# Patient Record
Sex: FEMALE | Race: WHITE | NOT HISPANIC OR LATINO | Employment: OTHER | URBAN - METROPOLITAN AREA
[De-identification: names, ages, dates, MRNs, and addresses within clinical notes are randomized per-mention and may not be internally consistent; named-entity substitution may affect disease eponyms.]

---

## 2023-11-09 ENCOUNTER — APPOINTMENT (OUTPATIENT)
Dept: CARDIOLOGY | Facility: CLINIC | Age: 83
End: 2023-11-09
Payer: MEDICARE

## 2023-11-13 DIAGNOSIS — I10 ESSENTIAL HYPERTENSION: ICD-10-CM

## 2023-11-20 ENCOUNTER — OFFICE VISIT (OUTPATIENT)
Dept: CARDIOLOGY | Facility: CLINIC | Age: 83
End: 2023-11-20
Payer: MEDICARE

## 2023-11-20 VITALS
BODY MASS INDEX: 31.15 KG/M2 | DIASTOLIC BLOOD PRESSURE: 58 MMHG | HEIGHT: 62 IN | SYSTOLIC BLOOD PRESSURE: 138 MMHG | WEIGHT: 169.3 LBS

## 2023-11-20 DIAGNOSIS — R00.2 PALPITATIONS: ICD-10-CM

## 2023-11-20 DIAGNOSIS — E78.2 MIXED HYPERLIPIDEMIA: ICD-10-CM

## 2023-11-20 DIAGNOSIS — I27.20 PULMONARY HTN (MULTI): ICD-10-CM

## 2023-11-20 DIAGNOSIS — Z78.9 NEVER SMOKED TOBACCO: ICD-10-CM

## 2023-11-20 DIAGNOSIS — E66.01 CLASS 2 SEVERE OBESITY DUE TO EXCESS CALORIES WITH SERIOUS COMORBIDITY IN ADULT, UNSPECIFIED BMI (MULTI): ICD-10-CM

## 2023-11-20 DIAGNOSIS — I10 ESSENTIAL HYPERTENSION: Primary | ICD-10-CM

## 2023-11-20 PROBLEM — E66.9 OBESE: Status: ACTIVE | Noted: 2023-11-20

## 2023-11-20 PROBLEM — J96.01 ACUTE RESPIRATORY FAILURE WITH HYPOXIA (MULTI): Status: ACTIVE | Noted: 2023-07-30

## 2023-11-20 PROCEDURE — 3075F SYST BP GE 130 - 139MM HG: CPT | Performed by: INTERNAL MEDICINE

## 2023-11-20 PROCEDURE — 1036F TOBACCO NON-USER: CPT | Performed by: INTERNAL MEDICINE

## 2023-11-20 PROCEDURE — 99214 OFFICE O/P EST MOD 30 MIN: CPT | Performed by: INTERNAL MEDICINE

## 2023-11-20 PROCEDURE — 3078F DIAST BP <80 MM HG: CPT | Performed by: INTERNAL MEDICINE

## 2023-11-20 RX ORDER — GABAPENTIN 300 MG/1
300 CAPSULE ORAL 3 TIMES DAILY
COMMUNITY

## 2023-11-20 RX ORDER — MULTIVIT-MIN/IRON/FOLIC ACID/K 18-600-40
1 CAPSULE ORAL 2 TIMES DAILY
COMMUNITY

## 2023-11-20 RX ORDER — LOSARTAN POTASSIUM AND HYDROCHLOROTHIAZIDE 12.5; 5 MG/1; MG/1
1 TABLET ORAL DAILY
Qty: 90 TABLET | Refills: 3 | Status: SHIPPED | OUTPATIENT
Start: 2023-11-20 | End: 2023-12-01 | Stop reason: DRUGHIGH

## 2023-11-20 RX ORDER — NAPROXEN SODIUM 220 MG/1
1 TABLET, FILM COATED ORAL DAILY
COMMUNITY

## 2023-11-20 RX ORDER — ACETAMINOPHEN 500 MG
2000 TABLET ORAL DAILY
COMMUNITY

## 2023-11-20 RX ORDER — ATORVASTATIN CALCIUM 80 MG/1
1 TABLET, FILM COATED ORAL DAILY
COMMUNITY

## 2023-11-20 RX ORDER — FAMOTIDINE 20 MG/1
1 TABLET, FILM COATED ORAL 2 TIMES DAILY
COMMUNITY

## 2023-11-20 RX ORDER — DILTIAZEM HYDROCHLORIDE EXTENDED-RELEASE TABLETS 300 MG/1
300 TABLET, EXTENDED RELEASE ORAL DAILY
COMMUNITY
Start: 2023-09-19

## 2023-11-20 RX ORDER — HYDRALAZINE HYDROCHLORIDE 25 MG/1
25 TABLET, FILM COATED ORAL EVERY 12 HOURS
COMMUNITY

## 2023-11-20 RX ORDER — DOXAZOSIN 2 MG/1
1 TABLET ORAL DAILY
COMMUNITY

## 2023-11-20 RX ORDER — UBIDECARENONE 75 MG
1 CAPSULE ORAL DAILY
COMMUNITY

## 2023-11-20 RX ORDER — LOSARTAN POTASSIUM AND HYDROCHLOROTHIAZIDE 25; 100 MG/1; MG/1
1 TABLET ORAL DAILY
COMMUNITY
End: 2023-11-20 | Stop reason: ALTCHOICE

## 2023-11-20 RX ORDER — ACETAMINOPHEN 325 MG/1
500 TABLET ORAL EVERY 6 HOURS PRN
COMMUNITY
Start: 2023-08-07

## 2023-11-20 RX ORDER — CYANOCOBALAMIN (VITAMIN B-12) 500 MCG
1 TABLET ORAL DAILY
COMMUNITY

## 2023-11-20 ASSESSMENT — ENCOUNTER SYMPTOMS
RESPIRATORY NEGATIVE: 1
NEUROLOGICAL NEGATIVE: 1
CONSTITUTIONAL NEGATIVE: 1
CARDIOVASCULAR NEGATIVE: 1

## 2023-11-20 NOTE — PROGRESS NOTES
"CARDIOLOGY OFFICE VISIT      CHIEF COMPLAINT  Chief Complaint   Patient presents with    Follow-up     Patient presented today for cardiac follow up.        HISTORY OF PRESENT ILLNESS  Therese Child is a 82 y.o. year old female patient with a history of hypertension, moderate pulmonary hypertension with echocardiogram that showed moderate to severe tricuspid regurgitation with RVSP of 52 mmHg.  She is continue to do well denying any chest pain or significant shortness of breath with her day-to-day activities.  She has a chronic gait imbalance and she states she walks as if she is drunk all the time.  She has a pending neurological evaluation as well as ENT evaluation secondary to persistent tinnitus.  She denies chest pain paroxysmal nocturnal dyspnea.    ASSESSMENT AND PLAN  1.  Hypertension: Blood pressures well controlled on current medications, however because of her complaints of dizziness, I will reduce losartan HCTZ to 50/12.5 mg daily and I advised her to follow-up with a neurologist.  2.  Pulmonary hypertension: With stable symptoms and preserved LV function as noted above.  3.  Dyslipidemia: We will get repeat lipids and LFTs prior to next follow-up.    Problem List Items Addressed This Visit       Essential hypertension - Primary    Mixed hyperlipidemia    Palpitations    Pulmonary HTN (CMS/HCC)    Obese    Never smoked tobacco       Recent Cardiovascular Testing:    Echo-  Stress-  Cath-  Carotid Ultrasound-    Past Medical History  No past medical history on file.    Social History  Social History     Tobacco Use    Smoking status: Never    Smokeless tobacco: Never   Substance Use Topics    Alcohol use: Never    Drug use: Never       Family History     Family History   Problem Relation Name Age of Onset    Diabetes type I Mother          Allergies:  Allergies   Allergen Reactions    Ciprofloxacin Other     bleeding gums    Corticosteroids (Glucocorticoids) Other     PT. states she went \"crazy\"    " "Dexamethasone Other     PT. states she went \"crazy\"    Doxycycline Unknown    Oxycodone Unknown        Outpatient Medications:  Current Outpatient Medications   Medication Instructions    acetaminophen (TYLENOL) 500 mg, oral, Every 6 hours PRN    ascorbic acid, vitamin C, 500 mg capsule 1 capsule, oral, 2 times daily    aspirin 81 mg chewable tablet 1 tablet, oral, Daily    atorvastatin (Lipitor) 80 mg tablet 1 tablet, oral, Daily    cholecalciferol (VITAMIN D-3) 2,000 Units, oral, Daily    cyanocobalamin (Vitamin B-12) 500 mcg tablet 1 tablet, oral, Daily    dilTIAZem LA (CARDIZEM LA) 300 mg, oral, Daily    doxazosin (Cardura) 2 mg tablet 1 tablet, oral, Daily    famotidine (Pepcid) 20 mg tablet 1 tablet, oral, 2 times daily    folic acid 1 mg, oral, Daily    gabapentin (NEURONTIN) 300 mg, oral, 3 times daily    hydrALAZINE (APRESOLINE) 25 mg, oral, Every 12 hours        Recent Lab Results:    CBC:   Lab Results   Component Value Date    WBC 4.7 07/29/2023    RBC 3.78 (L) 07/29/2023    HGB 12.4 07/29/2023    HCT 36.1 07/29/2023     (L) 07/29/2023        CMP:    Lab Results   Component Value Date     07/29/2023    K 3.5 07/29/2023    CL 98 07/29/2023    CO2 29 07/29/2023    BUN 25 (H) 07/29/2023    CREATININE 0.86 07/29/2023    GLUCOSE 127 (H) 07/29/2023    CALCIUM 8.8 07/29/2023       Magnesium:    Lab Results   Component Value Date    MG 1.70 07/25/2023       Lipid Profile:    Lab Results   Component Value Date    TRIG 81 06/02/2021    HDL 78.0 06/02/2021       TSH:    Lab Results   Component Value Date    TSH 0.25 (L) 12/27/2018       BNP:   Lab Results   Component Value Date     (H) 07/24/2023        PT/INR:    Lab Results   Component Value Date    PROTIME 12.9 (H) 07/24/2023    INR 1.1 07/24/2023       HgBA1c:    No results found for: \"HGBA1C\"    BMP:  Lab Results   Component Value Date     07/29/2023     (L) 07/28/2023     07/27/2023    K 3.5 07/29/2023    K 3.7 07/28/2023 "    K 3.7 07/27/2023    CL 98 07/29/2023     07/28/2023     07/27/2023    CO2 29 07/29/2023    CO2 29 07/28/2023    CO2 27 07/27/2023    BUN 25 (H) 07/29/2023    BUN 25 (H) 07/28/2023    BUN 26 (H) 07/27/2023    CREATININE 0.86 07/29/2023    CREATININE 0.86 07/28/2023    CREATININE 1.03 07/27/2023       CBC:  Lab Results   Component Value Date    WBC 4.7 07/29/2023    WBC 4.2 (L) 07/28/2023    WBC 3.9 (L) 07/27/2023    RBC 3.78 (L) 07/29/2023    RBC 3.47 (L) 07/28/2023    RBC 3.27 (L) 07/27/2023    HGB 12.4 07/29/2023    HGB 11.3 (L) 07/28/2023    HGB 10.6 (L) 07/27/2023    HCT 36.1 07/29/2023    HCT 33.9 (L) 07/28/2023    HCT 32.1 (L) 07/27/2023    MCV 96 07/29/2023    MCV 98 07/28/2023    MCV 98 07/27/2023    MCHC 34.3 07/29/2023    MCHC 33.3 07/28/2023    MCHC 33.0 07/27/2023    RDW 11.9 07/29/2023    RDW 11.9 07/28/2023    RDW 12.1 07/27/2023     (L) 07/29/2023     (L) 07/28/2023     (L) 07/27/2023       Cardiac Enzymes:    Lab Results   Component Value Date    TROPHS 93 (HH) 07/26/2023    TROPHS CANCELED 07/25/2023    TROPHS 86 (H) 07/25/2023       Hepatic Function Panel:    Lab Results   Component Value Date    ALKPHOS 36 07/29/2023    ALT 13 07/29/2023    AST 17 07/29/2023    PROT 5.9 (L) 07/29/2023    BILITOT 0.6 07/29/2023    BILIDIR 0.3 03/24/2020         REVIEW OF SYSTEMS  Review of Systems   Constitutional: Negative.   Cardiovascular: Negative.    Respiratory: Negative.     Neurological: Negative.    All other systems reviewed and are negative.      VITALS  Vitals:    11/20/23 1042   BP: 138/58     Wt Readings from Last 4 Encounters:   11/20/23 76.8 kg (169 lb 4.8 oz)   02/21/23 81.6 kg (180 lb)   07/27/22 84.9 kg (187 lb 4 oz)   01/05/22 85.7 kg (188 lb 14.4 oz)       PHYSICAL EXAM  Constitutional:       Appearance: Healthy appearance. Not in distress.   Eyes:      Conjunctiva/sclera: Conjunctivae normal.      Pupils: Pupils are equal, round, and reactive to light.    Neck:      Vascular: No JVR. JVD normal.   Pulmonary:      Effort: Pulmonary effort is normal.      Breath sounds: Normal breath sounds. No wheezing. No rhonchi. No rales.   Chest:      Chest wall: Not tender to palpatation.   Cardiovascular:      PMI at left midclavicular line. Normal rate. Regular rhythm. Normal S1. Normal S2.       Murmurs:  2/6 systolic murmur  in right and left sternal border      No gallop.  No click. No rub.   Pulses:     Intact distal pulses.   Edema:     Peripheral edema absent.   Abdominal:      Tenderness: There is no abdominal tenderness.   Musculoskeletal: Normal range of motion.         General: No tenderness.      Cervical back: Normal range of motion. Skin:     General: Skin is warm and dry.   Neurological:      General: No focal deficit present.      Mental Status: Alert and oriented to person, place and time.

## 2023-11-21 RX ORDER — DILTIAZEM HYDROCHLORIDE 360 MG/1
360 CAPSULE, EXTENDED RELEASE ORAL DAILY
Qty: 90 CAPSULE | Refills: 3 | OUTPATIENT
Start: 2023-11-21

## 2023-11-27 ENCOUNTER — TELEPHONE (OUTPATIENT)
Dept: CARDIOLOGY | Facility: CLINIC | Age: 83
End: 2023-11-27
Payer: MEDICARE

## 2023-11-27 DIAGNOSIS — I10 ESSENTIAL HYPERTENSION: Primary | ICD-10-CM

## 2023-11-27 NOTE — TELEPHONE ENCOUNTER
Pt left message stating that her bp is starting to run high since the decrease in her medication.  Per pt it is running in the 150's.  Per pt this morning her SBP was 161.    Pt is asking for return call with recommendations.    Routed to Bryant BOLIVAR LPN

## 2023-12-01 RX ORDER — LOSARTAN POTASSIUM AND HYDROCHLOROTHIAZIDE 12.5; 1 MG/1; MG/1
1 TABLET ORAL DAILY
Qty: 90 TABLET | Refills: 3 | OUTPATIENT
Start: 2023-12-01 | End: 2024-11-30

## 2023-12-01 NOTE — TELEPHONE ENCOUNTER
I called patient and BP is still elevated. Per Dr David increase losartan back to 100 mg /hydrochlorothiazide 12.5 mg a day. Sent to [facility] to auth. [facility].

## 2023-12-04 DIAGNOSIS — I10 ESSENTIAL HYPERTENSION: ICD-10-CM

## 2023-12-04 RX ORDER — LOSARTAN POTASSIUM AND HYDROCHLOROTHIAZIDE 12.5; 5 MG/1; MG/1
1 TABLET ORAL DAILY
Qty: 90 TABLET | Refills: 3 | OUTPATIENT
Start: 2023-12-04 | End: 2024-12-03

## 2024-01-31 ENCOUNTER — APPOINTMENT (OUTPATIENT)
Dept: CARDIOLOGY | Facility: CLINIC | Age: 84
End: 2024-01-31
Payer: MEDICARE

## 2024-02-02 ENCOUNTER — APPOINTMENT (OUTPATIENT)
Dept: CARDIOLOGY | Facility: CLINIC | Age: 84
End: 2024-02-02
Payer: MEDICARE

## 2024-03-01 DIAGNOSIS — I10 ESSENTIAL HYPERTENSION: ICD-10-CM

## 2024-03-22 RX ORDER — DILTIAZEM HYDROCHLORIDE 360 MG/1
360 CAPSULE, EXTENDED RELEASE ORAL DAILY
Qty: 30 CAPSULE | Refills: 11 | Status: SHIPPED | OUTPATIENT
Start: 2024-03-22

## 2025-01-31 ENCOUNTER — APPOINTMENT (OUTPATIENT)
Dept: RADIOLOGY | Facility: HOSPITAL | Age: 85
End: 2025-01-31
Payer: MEDICARE

## 2025-01-31 ENCOUNTER — HOSPITAL ENCOUNTER (EMERGENCY)
Dept: CARDIOLOGY | Facility: HOSPITAL | Age: 85
Discharge: HOME | End: 2025-01-31
Payer: MEDICARE

## 2025-01-31 ENCOUNTER — HOSPITAL ENCOUNTER (INPATIENT)
Facility: HOSPITAL | Age: 85
Discharge: HOME | End: 2025-01-31
Attending: EMERGENCY MEDICINE | Admitting: HOSPITALIST
Payer: MEDICARE

## 2025-01-31 DIAGNOSIS — B33.8 RSV INFECTION: ICD-10-CM

## 2025-01-31 DIAGNOSIS — I10 ESSENTIAL HYPERTENSION: ICD-10-CM

## 2025-01-31 DIAGNOSIS — N17.9 AKI (ACUTE KIDNEY INJURY) (CMS-HCC): ICD-10-CM

## 2025-01-31 DIAGNOSIS — J18.9 PNEUMONIA OF LEFT LUNG DUE TO INFECTIOUS ORGANISM, UNSPECIFIED PART OF LUNG: Primary | ICD-10-CM

## 2025-01-31 DIAGNOSIS — R06.02 SHORTNESS OF BREATH: ICD-10-CM

## 2025-01-31 LAB
ALBUMIN SERPL BCP-MCNC: 4.2 G/DL (ref 3.4–5)
ALP SERPL-CCNC: 70 U/L (ref 33–136)
ALT SERPL W P-5'-P-CCNC: 13 U/L (ref 7–45)
ANION GAP SERPL CALC-SCNC: 16 MMOL/L (ref 10–20)
APPEARANCE UR: CLEAR
AST SERPL W P-5'-P-CCNC: 21 U/L (ref 9–39)
ATRIAL RATE: 66 BPM
BASOPHILS # BLD AUTO: 0.05 X10*3/UL (ref 0–0.1)
BASOPHILS NFR BLD AUTO: 0.5 %
BILIRUB SERPL-MCNC: 0.6 MG/DL (ref 0–1.2)
BILIRUB UR STRIP.AUTO-MCNC: NEGATIVE MG/DL
BUN SERPL-MCNC: 28 MG/DL (ref 6–23)
CALCIUM SERPL-MCNC: 9.6 MG/DL (ref 8.6–10.3)
CARDIAC TROPONIN I PNL SERPL HS: 90 NG/L (ref 0–13)
CARDIAC TROPONIN I PNL SERPL HS: 97 NG/L (ref 0–13)
CHLORIDE SERPL-SCNC: 96 MMOL/L (ref 98–107)
CO2 SERPL-SCNC: 24 MMOL/L (ref 21–32)
COLOR UR: YELLOW
CREAT SERPL-MCNC: 1.06 MG/DL (ref 0.5–1.05)
EGFRCR SERPLBLD CKD-EPI 2021: 52 ML/MIN/1.73M*2
EOSINOPHIL # BLD AUTO: 0 X10*3/UL (ref 0–0.4)
EOSINOPHIL NFR BLD AUTO: 0 %
ERYTHROCYTE [DISTWIDTH] IN BLOOD BY AUTOMATED COUNT: 13.2 % (ref 11.5–14.5)
FLUAV RNA RESP QL NAA+PROBE: NOT DETECTED
FLUBV RNA RESP QL NAA+PROBE: NOT DETECTED
GLUCOSE SERPL-MCNC: 102 MG/DL (ref 74–99)
GLUCOSE UR STRIP.AUTO-MCNC: NORMAL MG/DL
HCT VFR BLD AUTO: 36.7 % (ref 36–46)
HGB BLD-MCNC: 12.8 G/DL (ref 12–16)
HOLD SPECIMEN: NORMAL
IMM GRANULOCYTES # BLD AUTO: 0.04 X10*3/UL (ref 0–0.5)
IMM GRANULOCYTES NFR BLD AUTO: 0.4 % (ref 0–0.9)
INR PPP: 1.1 (ref 0.9–1.1)
KETONES UR STRIP.AUTO-MCNC: ABNORMAL MG/DL
LACTATE SERPL-SCNC: 0.9 MMOL/L (ref 0.4–2)
LEUKOCYTE ESTERASE UR QL STRIP.AUTO: NEGATIVE
LYMPHOCYTES # BLD AUTO: 0.57 X10*3/UL (ref 0.8–3)
LYMPHOCYTES NFR BLD AUTO: 5.5 %
MAGNESIUM SERPL-MCNC: 1.82 MG/DL (ref 1.6–2.4)
MCH RBC QN AUTO: 33 PG (ref 26–34)
MCHC RBC AUTO-ENTMCNC: 34.9 G/DL (ref 32–36)
MCV RBC AUTO: 95 FL (ref 80–100)
MONOCYTES # BLD AUTO: 0.59 X10*3/UL (ref 0.05–0.8)
MONOCYTES NFR BLD AUTO: 5.7 %
NEUTROPHILS # BLD AUTO: 9.07 X10*3/UL (ref 1.6–5.5)
NEUTROPHILS NFR BLD AUTO: 87.9 %
NITRITE UR QL STRIP.AUTO: NEGATIVE
NRBC BLD-RTO: 0 /100 WBCS (ref 0–0)
P AXIS: 20 DEGREES
P OFFSET: 207 MS
P ONSET: 154 MS
PH UR STRIP.AUTO: 6 [PH]
PLATELET # BLD AUTO: 221 X10*3/UL (ref 150–450)
POTASSIUM SERPL-SCNC: 4.7 MMOL/L (ref 3.5–5.3)
PR INTERVAL: 146 MS
PROT SERPL-MCNC: 7 G/DL (ref 6.4–8.2)
PROT UR STRIP.AUTO-MCNC: ABNORMAL MG/DL
PROTHROMBIN TIME: 12.1 SECONDS (ref 9.8–12.8)
Q ONSET: 227 MS
QRS COUNT: 11 BEATS
QRS DURATION: 84 MS
QT INTERVAL: 370 MS
QTC CALCULATION(BAZETT): 387 MS
QTC FREDERICIA: 382 MS
R AXIS: -26 DEGREES
RBC # BLD AUTO: 3.88 X10*6/UL (ref 4–5.2)
RBC # UR STRIP.AUTO: NEGATIVE /UL
RBC #/AREA URNS AUTO: ABNORMAL /HPF
RSV RNA RESP QL NAA+PROBE: DETECTED
SARS-COV-2 RNA RESP QL NAA+PROBE: NOT DETECTED
SODIUM SERPL-SCNC: 131 MMOL/L (ref 136–145)
SP GR UR STRIP.AUTO: 1.02
T AXIS: 111 DEGREES
T OFFSET: 412 MS
UROBILINOGEN UR STRIP.AUTO-MCNC: NORMAL MG/DL
VENTRICULAR RATE: 66 BPM
WBC # BLD AUTO: 10.3 X10*3/UL (ref 4.4–11.3)
WBC #/AREA URNS AUTO: ABNORMAL /HPF

## 2025-01-31 PROCEDURE — 87637 SARSCOV2&INF A&B&RSV AMP PRB: CPT | Performed by: NURSE PRACTITIONER

## 2025-01-31 PROCEDURE — 2500000004 HC RX 250 GENERAL PHARMACY W/ HCPCS (ALT 636 FOR OP/ED): Performed by: NURSE PRACTITIONER

## 2025-01-31 PROCEDURE — 94640 AIRWAY INHALATION TREATMENT: CPT

## 2025-01-31 PROCEDURE — 71275 CT ANGIOGRAPHY CHEST: CPT | Mod: FOREIGN READ | Performed by: RADIOLOGY

## 2025-01-31 PROCEDURE — 85610 PROTHROMBIN TIME: CPT | Performed by: NURSE PRACTITIONER

## 2025-01-31 PROCEDURE — 2500000001 HC RX 250 WO HCPCS SELF ADMINISTERED DRUGS (ALT 637 FOR MEDICARE OP): Performed by: NURSE PRACTITIONER

## 2025-01-31 PROCEDURE — 81001 URINALYSIS AUTO W/SCOPE: CPT | Performed by: NURSE PRACTITIONER

## 2025-01-31 PROCEDURE — 87636 SARSCOV2 & INF A&B AMP PRB: CPT | Performed by: NURSE PRACTITIONER

## 2025-01-31 PROCEDURE — 83735 ASSAY OF MAGNESIUM: CPT | Performed by: NURSE PRACTITIONER

## 2025-01-31 PROCEDURE — 71275 CT ANGIOGRAPHY CHEST: CPT

## 2025-01-31 PROCEDURE — 93005 ELECTROCARDIOGRAM TRACING: CPT

## 2025-01-31 PROCEDURE — 99223 1ST HOSP IP/OBS HIGH 75: CPT | Performed by: HOSPITALIST

## 2025-01-31 PROCEDURE — 87040 BLOOD CULTURE FOR BACTERIA: CPT | Mod: ELYLAB | Performed by: NURSE PRACTITIONER

## 2025-01-31 PROCEDURE — 96367 TX/PROPH/DG ADDL SEQ IV INF: CPT

## 2025-01-31 PROCEDURE — 2500000004 HC RX 250 GENERAL PHARMACY W/ HCPCS (ALT 636 FOR OP/ED): Performed by: HOSPITALIST

## 2025-01-31 PROCEDURE — 96365 THER/PROPH/DIAG IV INF INIT: CPT

## 2025-01-31 PROCEDURE — 2550000001 HC RX 255 CONTRASTS: Performed by: EMERGENCY MEDICINE

## 2025-01-31 PROCEDURE — 84484 ASSAY OF TROPONIN QUANT: CPT | Performed by: NURSE PRACTITIONER

## 2025-01-31 PROCEDURE — 99285 EMERGENCY DEPT VISIT HI MDM: CPT | Mod: 25 | Performed by: EMERGENCY MEDICINE

## 2025-01-31 PROCEDURE — 2500000001 HC RX 250 WO HCPCS SELF ADMINISTERED DRUGS (ALT 637 FOR MEDICARE OP): Performed by: HOSPITALIST

## 2025-01-31 PROCEDURE — 36415 COLL VENOUS BLD VENIPUNCTURE: CPT | Performed by: NURSE PRACTITIONER

## 2025-01-31 PROCEDURE — 85025 COMPLETE CBC W/AUTO DIFF WBC: CPT | Performed by: NURSE PRACTITIONER

## 2025-01-31 PROCEDURE — 80053 COMPREHEN METABOLIC PANEL: CPT | Performed by: NURSE PRACTITIONER

## 2025-01-31 PROCEDURE — 71045 X-RAY EXAM CHEST 1 VIEW: CPT | Mod: FOREIGN READ | Performed by: SPECIALIST

## 2025-01-31 PROCEDURE — 2500000002 HC RX 250 W HCPCS SELF ADMINISTERED DRUGS (ALT 637 FOR MEDICARE OP, ALT 636 FOR OP/ED): Performed by: HOSPITALIST

## 2025-01-31 PROCEDURE — 83605 ASSAY OF LACTIC ACID: CPT | Performed by: NURSE PRACTITIONER

## 2025-01-31 PROCEDURE — 71045 X-RAY EXAM CHEST 1 VIEW: CPT

## 2025-01-31 PROCEDURE — 1200000002 HC GENERAL ROOM WITH TELEMETRY DAILY

## 2025-01-31 RX ORDER — NAPROXEN SODIUM 220 MG/1
81 TABLET, FILM COATED ORAL DAILY
Status: DISPENSED | OUTPATIENT
Start: 2025-01-31

## 2025-01-31 RX ORDER — BENZONATATE 100 MG/1
100 CAPSULE ORAL EVERY 8 HOURS PRN
Status: DISPENSED | OUTPATIENT
Start: 2025-01-31

## 2025-01-31 RX ORDER — ALBUTEROL SULFATE 90 UG/1
2 INHALANT RESPIRATORY (INHALATION) EVERY 4 HOURS PRN
Status: ON HOLD | COMMUNITY
Start: 2025-01-30

## 2025-01-31 RX ORDER — DOXAZOSIN 1 MG/1
2 TABLET ORAL DAILY
Status: DISPENSED | OUTPATIENT
Start: 2025-01-31

## 2025-01-31 RX ORDER — ENOXAPARIN SODIUM 100 MG/ML
40 INJECTION SUBCUTANEOUS EVERY 24 HOURS
Status: DISPENSED | OUTPATIENT
Start: 2025-01-31

## 2025-01-31 RX ORDER — ATORVASTATIN CALCIUM 80 MG/1
80 TABLET, FILM COATED ORAL DAILY
Status: DISPENSED | OUTPATIENT
Start: 2025-01-31

## 2025-01-31 RX ORDER — ACETAMINOPHEN 325 MG/1
975 TABLET ORAL ONCE
Status: COMPLETED | OUTPATIENT
Start: 2025-01-31 | End: 2025-01-31

## 2025-01-31 RX ORDER — HYDRALAZINE HYDROCHLORIDE 25 MG/1
25 TABLET, FILM COATED ORAL EVERY 12 HOURS
Status: DISPENSED | OUTPATIENT
Start: 2025-01-31

## 2025-01-31 RX ORDER — DILTIAZEM HYDROCHLORIDE 300 MG/1
300 CAPSULE, COATED, EXTENDED RELEASE ORAL DAILY
Status: DISPENSED | OUTPATIENT
Start: 2025-02-01

## 2025-01-31 RX ORDER — METHOCARBAMOL 500 MG/1
500 TABLET, FILM COATED ORAL EVERY 8 HOURS SCHEDULED
Status: DISCONTINUED | OUTPATIENT
Start: 2025-01-31 | End: 2025-01-31

## 2025-01-31 RX ORDER — PREDNISONE 20 MG/1
20 TABLET ORAL DAILY
Status: DISPENSED | OUTPATIENT
Start: 2025-01-31

## 2025-01-31 RX ORDER — DILTIAZEM HYDROCHLORIDE 300 MG/1
1 CAPSULE, EXTENDED RELEASE ORAL DAILY
Status: ON HOLD | COMMUNITY
Start: 2024-11-16

## 2025-01-31 RX ORDER — METHOCARBAMOL 500 MG/1
500 TABLET, FILM COATED ORAL 3 TIMES DAILY PRN
Status: ACTIVE | OUTPATIENT
Start: 2025-01-31

## 2025-01-31 RX ORDER — IPRATROPIUM BROMIDE AND ALBUTEROL SULFATE 2.5; .5 MG/3ML; MG/3ML
3 SOLUTION RESPIRATORY (INHALATION)
Status: DISCONTINUED | OUTPATIENT
Start: 2025-01-31 | End: 2025-02-01

## 2025-01-31 RX ORDER — AZITHROMYCIN 250 MG/1
500 TABLET, FILM COATED ORAL
Status: DISPENSED | OUTPATIENT
Start: 2025-02-01

## 2025-01-31 RX ORDER — FOLIC ACID 1 MG/1
0.5 TABLET ORAL DAILY
Status: DISPENSED | OUTPATIENT
Start: 2025-01-31

## 2025-01-31 RX ORDER — METHYLPREDNISOLONE 4 MG/1
TABLET ORAL
Status: ON HOLD | COMMUNITY
Start: 2025-01-30 | End: 2025-02-05

## 2025-01-31 RX ORDER — CEFTRIAXONE 1 G/50ML
1 INJECTION, SOLUTION INTRAVENOUS EVERY 24 HOURS
Status: DISPENSED | OUTPATIENT
Start: 2025-02-01

## 2025-01-31 RX ORDER — BENZONATATE 100 MG/1
100 CAPSULE ORAL EVERY 8 HOURS PRN
Status: ON HOLD | COMMUNITY
Start: 2025-01-30 | End: 2025-02-04

## 2025-01-31 RX ORDER — METHOCARBAMOL 500 MG/1
500 TABLET, FILM COATED ORAL 2 TIMES DAILY
Status: ON HOLD | COMMUNITY
Start: 2024-10-11

## 2025-01-31 RX ORDER — DILTIAZEM HYDROCHLORIDE 180 MG/1
360 CAPSULE, COATED, EXTENDED RELEASE ORAL DAILY
Status: DISCONTINUED | OUTPATIENT
Start: 2025-01-31 | End: 2025-01-31

## 2025-01-31 RX ORDER — LOSARTAN POTASSIUM AND HYDROCHLOROTHIAZIDE 25; 100 MG/1; MG/1
1 TABLET ORAL
Status: ON HOLD | COMMUNITY
Start: 2024-10-11

## 2025-01-31 RX ORDER — L. ACIDOPHILUS/L.BULGARICUS 1MM CELL
1 TABLET ORAL 2 TIMES DAILY
Status: DISPENSED | OUTPATIENT
Start: 2025-01-31

## 2025-01-31 RX ORDER — BUPROPION HYDROCHLORIDE 150 MG/1
150 TABLET ORAL
Status: DISPENSED | OUTPATIENT
Start: 2025-01-31

## 2025-01-31 RX ORDER — BUPROPION HYDROCHLORIDE 150 MG/1
150 TABLET ORAL
Status: ON HOLD | COMMUNITY
Start: 2024-08-23

## 2025-01-31 RX ORDER — GABAPENTIN 300 MG/1
300 CAPSULE ORAL 3 TIMES DAILY
Status: DISPENSED | OUTPATIENT
Start: 2025-01-31

## 2025-01-31 RX ADMIN — AZITHROMYCIN MONOHYDRATE 500 MG: 500 INJECTION, POWDER, LYOPHILIZED, FOR SOLUTION INTRAVENOUS at 15:25

## 2025-01-31 RX ADMIN — PREDNISONE 20 MG: 20 TABLET ORAL at 18:14

## 2025-01-31 RX ADMIN — Medication 1 TABLET: at 20:59

## 2025-01-31 RX ADMIN — ACETAMINOPHEN 975 MG: 325 TABLET ORAL at 10:43

## 2025-01-31 RX ADMIN — ASPIRIN 81 MG: 81 TABLET, CHEWABLE ORAL at 18:13

## 2025-01-31 RX ADMIN — IOHEXOL 75 ML: 350 INJECTION, SOLUTION INTRAVENOUS at 14:02

## 2025-01-31 RX ADMIN — FOLIC ACID 0.5 MG: 1 TABLET ORAL at 18:13

## 2025-01-31 RX ADMIN — GABAPENTIN 300 MG: 300 CAPSULE ORAL at 18:05

## 2025-01-31 RX ADMIN — DEXTROSE MONOHYDRATE 2 G: 5 INJECTION INTRAVENOUS at 14:41

## 2025-01-31 RX ADMIN — BENZONATATE 100 MG: 100 CAPSULE ORAL at 18:14

## 2025-01-31 RX ADMIN — ATORVASTATIN CALCIUM 80 MG: 80 TABLET, FILM COATED ORAL at 18:14

## 2025-01-31 RX ADMIN — HYDRALAZINE HYDROCHLORIDE 25 MG: 25 TABLET ORAL at 18:14

## 2025-01-31 RX ADMIN — BUPROPION HYDROCHLORIDE 150 MG: 150 TABLET, EXTENDED RELEASE ORAL at 18:14

## 2025-01-31 RX ADMIN — IPRATROPIUM BROMIDE AND ALBUTEROL SULFATE 3 ML: .5; 3 SOLUTION RESPIRATORY (INHALATION) at 20:02

## 2025-01-31 RX ADMIN — ENOXAPARIN SODIUM 40 MG: 40 INJECTION SUBCUTANEOUS at 21:00

## 2025-01-31 RX ADMIN — GABAPENTIN 300 MG: 300 CAPSULE ORAL at 20:59

## 2025-01-31 RX ADMIN — DOXAZOSIN 2 MG: 1 TABLET ORAL at 18:13

## 2025-01-31 SDOH — SOCIAL STABILITY: SOCIAL INSECURITY: WITHIN THE LAST YEAR, HAVE YOU BEEN HUMILIATED OR EMOTIONALLY ABUSED IN OTHER WAYS BY YOUR PARTNER OR EX-PARTNER?: NO

## 2025-01-31 SDOH — SOCIAL STABILITY: SOCIAL INSECURITY: ARE YOU OR HAVE YOU BEEN THREATENED OR ABUSED PHYSICALLY, EMOTIONALLY, OR SEXUALLY BY ANYONE?: NO

## 2025-01-31 SDOH — ECONOMIC STABILITY: FOOD INSECURITY: WITHIN THE PAST 12 MONTHS, YOU WORRIED THAT YOUR FOOD WOULD RUN OUT BEFORE YOU GOT THE MONEY TO BUY MORE.: NEVER TRUE

## 2025-01-31 SDOH — ECONOMIC STABILITY: HOUSING INSECURITY: AT ANY TIME IN THE PAST 12 MONTHS, WERE YOU HOMELESS OR LIVING IN A SHELTER (INCLUDING NOW)?: NO

## 2025-01-31 SDOH — SOCIAL STABILITY: SOCIAL INSECURITY: WITHIN THE LAST YEAR, HAVE YOU BEEN AFRAID OF YOUR PARTNER OR EX-PARTNER?: NO

## 2025-01-31 SDOH — ECONOMIC STABILITY: TRANSPORTATION INSECURITY: IN THE PAST 12 MONTHS, HAS LACK OF TRANSPORTATION KEPT YOU FROM MEDICAL APPOINTMENTS OR FROM GETTING MEDICATIONS?: NO

## 2025-01-31 SDOH — ECONOMIC STABILITY: FOOD INSECURITY: WITHIN THE PAST 12 MONTHS, THE FOOD YOU BOUGHT JUST DIDN'T LAST AND YOU DIDN'T HAVE MONEY TO GET MORE.: NEVER TRUE

## 2025-01-31 SDOH — SOCIAL STABILITY: SOCIAL INSECURITY
WITHIN THE LAST YEAR, HAVE YOU BEEN KICKED, HIT, SLAPPED, OR OTHERWISE PHYSICALLY HURT BY YOUR PARTNER OR EX-PARTNER?: NO

## 2025-01-31 SDOH — SOCIAL STABILITY: SOCIAL INSECURITY: DO YOU FEEL ANYONE HAS EXPLOITED OR TAKEN ADVANTAGE OF YOU FINANCIALLY OR OF YOUR PERSONAL PROPERTY?: NO

## 2025-01-31 SDOH — SOCIAL STABILITY: SOCIAL INSECURITY: HAS ANYONE EVER THREATENED TO HURT YOUR FAMILY OR YOUR PETS?: NO

## 2025-01-31 SDOH — SOCIAL STABILITY: SOCIAL INSECURITY: HAVE YOU HAD ANY THOUGHTS OF HARMING ANYONE ELSE?: NO

## 2025-01-31 SDOH — SOCIAL STABILITY: SOCIAL INSECURITY: ABUSE: ADULT

## 2025-01-31 SDOH — ECONOMIC STABILITY: HOUSING INSECURITY: IN THE LAST 12 MONTHS, WAS THERE A TIME WHEN YOU WERE NOT ABLE TO PAY THE MORTGAGE OR RENT ON TIME?: NO

## 2025-01-31 SDOH — ECONOMIC STABILITY: INCOME INSECURITY: IN THE PAST 12 MONTHS HAS THE ELECTRIC, GAS, OIL, OR WATER COMPANY THREATENED TO SHUT OFF SERVICES IN YOUR HOME?: NO

## 2025-01-31 SDOH — SOCIAL STABILITY: SOCIAL INSECURITY
WITHIN THE LAST YEAR, HAVE YOU BEEN RAPED OR FORCED TO HAVE ANY KIND OF SEXUAL ACTIVITY BY YOUR PARTNER OR EX-PARTNER?: NO

## 2025-01-31 SDOH — ECONOMIC STABILITY: FOOD INSECURITY: HOW HARD IS IT FOR YOU TO PAY FOR THE VERY BASICS LIKE FOOD, HOUSING, MEDICAL CARE, AND HEATING?: NOT HARD AT ALL

## 2025-01-31 SDOH — SOCIAL STABILITY: SOCIAL INSECURITY: DO YOU FEEL UNSAFE GOING BACK TO THE PLACE WHERE YOU ARE LIVING?: NO

## 2025-01-31 SDOH — SOCIAL STABILITY: SOCIAL INSECURITY: WERE YOU ABLE TO COMPLETE ALL THE BEHAVIORAL HEALTH SCREENINGS?: YES

## 2025-01-31 SDOH — SOCIAL STABILITY: SOCIAL INSECURITY: DOES ANYONE TRY TO KEEP YOU FROM HAVING/CONTACTING OTHER FRIENDS OR DOING THINGS OUTSIDE YOUR HOME?: NO

## 2025-01-31 SDOH — SOCIAL STABILITY: SOCIAL INSECURITY: ARE THERE ANY APPARENT SIGNS OF INJURIES/BEHAVIORS THAT COULD BE RELATED TO ABUSE/NEGLECT?: NO

## 2025-01-31 SDOH — ECONOMIC STABILITY: HOUSING INSECURITY: IN THE PAST 12 MONTHS, HOW MANY TIMES HAVE YOU MOVED WHERE YOU WERE LIVING?: 1

## 2025-01-31 SDOH — SOCIAL STABILITY: SOCIAL INSECURITY: HAVE YOU HAD THOUGHTS OF HARMING ANYONE ELSE?: NO

## 2025-01-31 ASSESSMENT — PAIN DESCRIPTION - PAIN TYPE: TYPE: ACUTE PAIN

## 2025-01-31 ASSESSMENT — COGNITIVE AND FUNCTIONAL STATUS - GENERAL
STANDING UP FROM CHAIR USING ARMS: A LOT
MOVING FROM LYING ON BACK TO SITTING ON SIDE OF FLAT BED WITH BEDRAILS: A LITTLE
HELP NEEDED FOR BATHING: A LITTLE
PATIENT BASELINE BEDBOUND: NO
MOVING TO AND FROM BED TO CHAIR: A LOT
CLIMB 3 TO 5 STEPS WITH RAILING: A LOT
TOILETING: A LITTLE
MOVING TO AND FROM BED TO CHAIR: A LOT
HELP NEEDED FOR BATHING: A LITTLE
DRESSING REGULAR UPPER BODY CLOTHING: A LITTLE
DRESSING REGULAR LOWER BODY CLOTHING: A LITTLE
DAILY ACTIVITIY SCORE: 20
TOILETING: A LITTLE
TURNING FROM BACK TO SIDE WHILE IN FLAT BAD: A LITTLE
DRESSING REGULAR LOWER BODY CLOTHING: A LITTLE
MOBILITY SCORE: 15
MOBILITY SCORE: 14
DAILY ACTIVITIY SCORE: 20
WALKING IN HOSPITAL ROOM: A LOT
TURNING FROM BACK TO SIDE WHILE IN FLAT BAD: A LITTLE
CLIMB 3 TO 5 STEPS WITH RAILING: A LOT
DRESSING REGULAR UPPER BODY CLOTHING: A LITTLE
STANDING UP FROM CHAIR USING ARMS: A LOT
WALKING IN HOSPITAL ROOM: A LOT

## 2025-01-31 ASSESSMENT — LIFESTYLE VARIABLES
TOTAL SCORE: 0
AUDIT-C TOTAL SCORE: 0
HAVE YOU EVER FELT YOU SHOULD CUT DOWN ON YOUR DRINKING: NO
HAVE PEOPLE ANNOYED YOU BY CRITICIZING YOUR DRINKING: NO
SKIP TO QUESTIONS 9-10: 1
AUDIT-C TOTAL SCORE: 0
HOW MANY STANDARD DRINKS CONTAINING ALCOHOL DO YOU HAVE ON A TYPICAL DAY: PATIENT DOES NOT DRINK
EVER HAD A DRINK FIRST THING IN THE MORNING TO STEADY YOUR NERVES TO GET RID OF A HANGOVER: NO
HOW OFTEN DO YOU HAVE 6 OR MORE DRINKS ON ONE OCCASION: NEVER
EVER FELT BAD OR GUILTY ABOUT YOUR DRINKING: NO
HOW OFTEN DO YOU HAVE A DRINK CONTAINING ALCOHOL: NEVER

## 2025-01-31 ASSESSMENT — ACTIVITIES OF DAILY LIVING (ADL)
PATIENT'S MEMORY ADEQUATE TO SAFELY COMPLETE DAILY ACTIVITIES?: YES
LACK_OF_TRANSPORTATION: NO
FEEDING YOURSELF: INDEPENDENT
WALKS IN HOME: NEEDS ASSISTANCE
DRESSING YOURSELF: INDEPENDENT
ADEQUATE_TO_COMPLETE_ADL: YES
HEARING - RIGHT EAR: FUNCTIONAL
BATHING: INDEPENDENT
HEARING - LEFT EAR: FUNCTIONAL
ASSISTIVE_DEVICE: WALKER
TOILETING: INDEPENDENT
JUDGMENT_ADEQUATE_SAFELY_COMPLETE_DAILY_ACTIVITIES: YES
GROOMING: INDEPENDENT
LACK_OF_TRANSPORTATION: NO

## 2025-01-31 ASSESSMENT — PAIN DESCRIPTION - DESCRIPTORS: DESCRIPTORS: ACHING

## 2025-01-31 ASSESSMENT — COLUMBIA-SUICIDE SEVERITY RATING SCALE - C-SSRS
1. IN THE PAST MONTH, HAVE YOU WISHED YOU WERE DEAD OR WISHED YOU COULD GO TO SLEEP AND NOT WAKE UP?: NO
6. HAVE YOU EVER DONE ANYTHING, STARTED TO DO ANYTHING, OR PREPARED TO DO ANYTHING TO END YOUR LIFE?: NO
2. HAVE YOU ACTUALLY HAD ANY THOUGHTS OF KILLING YOURSELF?: NO

## 2025-01-31 ASSESSMENT — PAIN SCALES - GENERAL
PAINLEVEL_OUTOF10: 0 - NO PAIN
PAINLEVEL_OUTOF10: 0 - NO PAIN
PAINLEVEL_OUTOF10: 9

## 2025-01-31 ASSESSMENT — PATIENT HEALTH QUESTIONNAIRE - PHQ9
SUM OF ALL RESPONSES TO PHQ9 QUESTIONS 1 & 2: 0
2. FEELING DOWN, DEPRESSED OR HOPELESS: NOT AT ALL
1. LITTLE INTEREST OR PLEASURE IN DOING THINGS: NOT AT ALL

## 2025-01-31 ASSESSMENT — PAIN DESCRIPTION - LOCATION: LOCATION: OTHER (COMMENT)

## 2025-01-31 ASSESSMENT — PAIN DESCRIPTION - ORIENTATION: ORIENTATION: RIGHT

## 2025-01-31 ASSESSMENT — PAIN - FUNCTIONAL ASSESSMENT: PAIN_FUNCTIONAL_ASSESSMENT: 0-10

## 2025-01-31 NOTE — ED PROVIDER NOTES
"HPI   Chief Complaint   Patient presents with    Weakness, Gen     Pt states she went to the [facility] yesterday due to being weak and \"feeling out of it\" Pt was diagnosed with RSV. Pt comes today stating she's having right sided flank pain and increased weakness.       84-year-old female presents emergency department, patient states she was seen yesterday at [facility], diagnosed with RSV.  States she was feeling very out of it, weak, had a cough.  Patient states she feels worse today, increased shortness of breath, increased fatigue and weakness.    Denies respiratory history, never smoker      History provided by:  Patient   used: No            Patient History   History reviewed. No pertinent past medical history.  Past Surgical History:   Procedure Laterality Date    OTHER SURGICAL HISTORY  11/22/2021    Appendectomy    OTHER SURGICAL HISTORY  11/22/2021    Brain tumor excision    OTHER SURGICAL HISTORY  11/22/2021    Hip surgery    OTHER SURGICAL HISTORY  11/22/2021    Colonoscopy    OTHER SURGICAL HISTORY  11/22/2021    Gamma knife radiosurgery    OTHER SURGICAL HISTORY  11/22/2021    Hysterectomy    OTHER SURGICAL HISTORY  11/22/2021    Knee surgery    OTHER SURGICAL HISTORY  11/22/2021    Spinal surgery    OTHER SURGICAL HISTORY  11/22/2021    Tonsillectomy with adenoidectomy    OTHER SURGICAL HISTORY  11/22/2021    Wrist surgery    OTHER SURGICAL HISTORY  11/22/2021    Shoulder surgery     Family History   Problem Relation Name Age of Onset    Diabetes type I Mother       Social History     Tobacco Use    Smoking status: Never    Smokeless tobacco: Never   Substance Use Topics    Alcohol use: Never    Drug use: Never       Physical Exam   ED Triage Vitals   Temperature Heart Rate Respirations BP   01/31/25 1030 01/31/25 1025 01/31/25 1025 01/31/25 1025   (!) 38 °C (100.4 °F) 65 17 172/60      Pulse Ox Temp Source Heart Rate Source Patient Position   01/31/25 1025 01/31/25 " 1030 01/31/25 1025 01/31/25 1025   98 % Oral Monitor Lying      BP Location FiO2 (%)     01/31/25 1025 --     Right arm        Physical Exam  Constitutional: Vitals noted, no distress. Afebrile.   Cardiovascular: Regular, rate, rhythm, no murmur.   Pulmonary: Lungs clear bilaterally with good aeration. No adventitious breath sounds.   Gastrointestinal: Soft, nonsurgical. Nontender. No peritoneal signs. Normoactive bowel sounds.   Musculoskeletal: No peripheral edema. Negative Homans bilaterally, no cords.   Skin: No rash.   Neuro: No focal neurologic deficits, NIH score of 0.      ED Course & MDM   Diagnoses as of 01/31/25 1544   Pneumonia of left lung due to infectious organism, unspecified part of lung   RSV infection   Shortness of breath     Labs Reviewed   CBC WITH AUTO DIFFERENTIAL - Abnormal       Result Value    WBC 10.3      nRBC 0.0      RBC 3.88 (*)     Hemoglobin 12.8      Hematocrit 36.7      MCV 95      MCH 33.0      MCHC 34.9      RDW 13.2      Platelets 221      Neutrophils % 87.9      Immature Granulocytes %, Automated 0.4      Lymphocytes % 5.5      Monocytes % 5.7      Eosinophils % 0.0      Basophils % 0.5      Neutrophils Absolute 9.07 (*)     Immature Granulocytes Absolute, Automated 0.04      Lymphocytes Absolute 0.57 (*)     Monocytes Absolute 0.59      Eosinophils Absolute 0.00      Basophils Absolute 0.05     COMPREHENSIVE METABOLIC PANEL - Abnormal    Glucose 102 (*)     Sodium 131 (*)     Potassium 4.7      Chloride 96 (*)     Bicarbonate 24      Anion Gap 16      Urea Nitrogen 28 (*)     Creatinine 1.06 (*)     eGFR 52 (*)     Calcium 9.6      Albumin 4.2      Alkaline Phosphatase 70      Total Protein 7.0      AST 21      Bilirubin, Total 0.6      ALT 13     TROPONIN I, HIGH SENSITIVITY - Abnormal    Troponin I, High Sensitivity 90 (*)     Narrative:     Less than 99th percentile of normal range cutoff-  Female and children under 18 years old <14 ng/L; Male <21 ng/L: Negative  Repeat  testing should be performed if clinically indicated.     Female and children under 18 years old 14-50 ng/L; Male 21-50 ng/L:  Consistent with possible cardiac damage and possible increased clinical   risk. Serial measurements may help to assess extent of myocardial damage.     >50 ng/L: Consistent with cardiac damage, increased clinical risk and  myocardial infarction. Serial measurements may help assess extent of   myocardial damage.      NOTE: Children less than 1 year old may have higher baseline troponin   levels and results should be interpreted in conjunction with the overall   clinical context.     NOTE: Troponin I testing is performed using a different   testing methodology at [facility] than at other   [facility]. Direct result comparisons should only   be made within the same method.   URINALYSIS WITH REFLEX CULTURE AND MICROSCOPIC - Abnormal    Color, Urine Yellow      Appearance, Urine Clear      Specific Gravity, Urine 1.024      pH, Urine 6.0      Protein, Urine 20 (TRACE)      Glucose, Urine Normal      Blood, Urine NEGATIVE      Ketones, Urine TRACE (*)     Bilirubin, Urine NEGATIVE      Urobilinogen, Urine Normal      Nitrite, Urine NEGATIVE      Leukocyte Esterase, Urine NEGATIVE     TROPONIN I, HIGH SENSITIVITY - Abnormal    Troponin I, High Sensitivity 97 (*)     Narrative:     Less than 99th percentile of normal range cutoff-  Female and children under 18 years old <14 ng/L; Male <21 ng/L: Negative  Repeat testing should be performed if clinically indicated.     Female and children under 18 years old 14-50 ng/L; Male 21-50 ng/L:  Consistent with possible cardiac damage and possible increased clinical   risk. Serial measurements may help to assess extent of myocardial damage.     >50 ng/L: Consistent with cardiac damage, increased clinical risk and  myocardial infarction. Serial measurements may help assess extent of   myocardial damage.      NOTE: Children less than 1 year  old may have higher baseline troponin   levels and results should be interpreted in conjunction with the overall   clinical context.     NOTE: Troponin I testing is performed using a different   testing methodology at [facility] than at other   [facility] hospitals. Direct result comparisons should only   be made within the same method.   RSV PCR - Abnormal    RSV PCR Detected (*)     Narrative:     This assay is an FDA-cleared, in vitro diagnostic nucleic acid amplification test for the detection of RSV from nasopharyngeal specimens, and has been validated for use at [facility]. Negative results do not preclude RSV infections, and should not be used as the sole basis for diagnosis, treatment, or other management decisions. If Influenza A/B and RSV PCR results are negative, testing for Parainfluenza virus, Adenovirus and Metapneumovirus is routinely performed for pediatric oncology and intensive care inpatients at [facility], and is available on other patients by placing an add-on request.       URINALYSIS MICROSCOPIC WITH REFLEX CULTURE - Abnormal    WBC, Urine NONE      RBC, Urine 6-10 (*)    BLOOD CULTURE - Normal    Blood Culture Loaded on Instrument - Culture in progress     BLOOD CULTURE - Normal    Blood Culture Loaded on Instrument - Culture in progress     MAGNESIUM - Normal    Magnesium 1.82     LACTATE - Normal    Lactate 0.9      Narrative:     Venipuncture immediately after or during the administration of Metamizole may lead to falsely low results. Testing should be performed immediately prior to Metamizole dosing.   PROTIME-INR - Normal    Protime 12.1      INR 1.1     SARS-COV-2 AND INFLUENZA A/B PCR - Normal    Flu A Result Not Detected      Flu B Result Not Detected      Coronavirus 2019, PCR Not Detected      Narrative:     This assay is an FDA-cleared, in vitro diagnostic nucleic acid amplification test for the qualitative detection and differentiation of SARS CoV-2/  Influenza A/B from nasopharyngeal specimens collected from individuals with signs and symptoms of respiratory tract infections, and has been validated for use at [facility]. Negative results do not preclude COVID-19/ Influenza A/B infections and should not be used as the sole basis for diagnosis, treatment, or other management decisions. Testing for SARS CoV-2 is recommended only for patients who meet current clinical and/or epidemiological criteria defined by federal, state, or local public health directives.   URINALYSIS WITH REFLEX CULTURE AND MICROSCOPIC    Narrative:     The following orders were created for panel order Urinalysis with Reflex Culture and Microscopic.  Procedure                               Abnormality         Status                     ---------                               -----------         ------                     Urinalysis with Reflex C...[[id number]]  Abnormal            Final result               Extra Urine Gray Tube[[id number]]                            In process                   Please view results for these tests on the individual orders.   EXTRA URINE GRAY TUBE        CT angio chest for pulmonary embolism   Final Result   1. No pulmonary emboli.   2. There is an opacity in the left upper lobe corresponding to the   density seen on the chest x-ray. This has the appearance of   consolidation and pneumonia rather than mass, though mass cannot be   excluded. Recommend follow-up chest CT following appropriate treatment   for pneumonia.   Signed by Yaya Olmedo MD      XR chest 1 view   Final Result   Addendum (preliminary) 1 of 1   NOTIFICATION:  Non-critical findings were relayed by Radiology Support   Staff to CAROL Allison on 1/31/2025 at 1227.   Signed by Leroy Peraza MD      Final   1.  There is a new 3.1 cm opacity which projects over the left midlung   zone which which is concerning for underlying mass.  A follow-up chest   CT is  recommended for further evaluation..   Signed by Leroy Peraza MD                    No data recorded     [place] Coma Scale Score: 15 (01/31/25 1029 : Cira Willoughby RN)                   Medical Decision Making  Patient presents, feels worse today than she did yesterday.  Notes she was seen yesterday at [facility] and diagnosed with RSV, sent home.    Workup today, EKG performed at 1026 with ventricular to 66, as read by me, shows normal sinus rhythm normal axis normal normal, remarkable ST and T wave patterns, no evidence of acute ischemia other acute findings per    Patient presents with fever, 38 °C.  Heart rate and blood pressures unremarkable.  With her febrile status there is concern for sepsis so blood cultures were obtained as well as laboratory workup.  CBC unremarkable with normal lactate level, metabolic panel shows slightly uptrending serum creatinine but otherwise unremarkable.  Initial troponin 90, delta troponin 97.  Patient negative for COVID-19 and influenza, positive for RSV.  Urinalysis unremarkable.    Was phoned by the radiology department, discussed a new 3.1 cm opacity concerning for mass in the left midlung.  I did review previous x-rays and CTs where this is not present.  Given her symptoms and concern for sepsis CT scan of the chest was obtained to better evaluate this finding.  CT chest ruled out pulmonary embolism but does show opacity in the left lower lobe concerning for consolidation rather than mass.    Started on antibiotics for community-acquired pneumonia, Zithromax and Rocephin.  Discussed with hospitalist, Dr. Walls who accepts the patient to her service.    Procedure  Procedures     Neeru Mathew, MIGUEL-CNP  01/31/25 0339

## 2025-01-31 NOTE — H&P
History and Physical        ASSESSMENT AND PLAN:     Acute hypoxic respiratory failure  RSV Bronchitis  Community-acquired pneumonia  -Presented to the emergency room with worsening shortness of breath, cough congestion  -Noted to be hypoxic on admission requiring 2 L of oxygen  -CT angio chest showed left lower lobe    Plan:  -Switch Medrol Dosepak to prednisone  -Continue ceftriaxone and Azithromycin   -Continue antitussive medication      Hypertension  - Hold antihypertensive for now.      VTE Prophylaxis: Lovenox        Louie Daniels MD    HISTORY OF PRESENT ILLNESS:   Chief Complaint: Cough, congestion    History Of Present Illness:    Therese Child is a 84 y.o. female with a significant past medical history of hypertension, CKD stage III, hyperlipidemia, who presented to [facility] for lysed weakness.    She was seen at [facility] ER yesterday for cough, congestion and generalized weakness.  She was found to have RSV bronchitis and discharged home with a medrol dose pack and Tessalon Perles.      She continued to experience symptoms which prompted her to come back to the emergency room.    Here her CT angio of chest showed a left lower lobe opacity.  She was noted to be hypoxic.            Review of systems: 10 point review of systems is otherwise negative except as mentioned above.    PAST HISTORIES:       Past Medical History:  She has no past medical history on file.    Past Surgical History:  She has a past surgical history that includes Other surgical history (11/22/2021); Other surgical history (11/22/2021); Other surgical history (11/22/2021); Other surgical history (11/22/2021); Other surgical history (11/22/2021); Other surgical history (11/22/2021); Other surgical history (11/22/2021); Other surgical history (11/22/2021); Other surgical history (11/22/2021); Other surgical history (11/22/2021); and Other surgical history (11/22/2021).      Social History:  She reports that  she has never smoked. She has never used smokeless tobacco. She reports that she does not drink alcohol and does not use drugs.    Family History:  Family History   Problem Relation Name Age of Onset    Diabetes type I Mother          Allergies:  Ciprofloxacin, Corticosteroids (glucocorticoids), Dexamethasone, Doxycycline, and Oxycodone    OBJECTIVE:       Last Recorded Vitals:  Vitals:    01/31/25 1030 01/31/25 1123 01/31/25 1227 01/31/25 1422   BP:  157/62 (!) 143/49 (!) 134/49   BP Location:    Right arm   Patient Position:    Lying   Pulse:  64 60 60   Resp:  17 16 17   Temp: (!) 38 °C (100.4 °F)   37.2 °C (99 °F)   TempSrc: Oral   Oral   SpO2:  98% 96% 97%   Weight:       Height:           Last I/O:  No intake/output data recorded.    Physical Exam      PHYSICAL EXAM:   GENERAL: Laying in bed, does not appear to be in any distress.   HEENT: HEAD: Normocephalic atraumatic.  Neck: Supple.  Eyes: Pupils are reactive to direct light.   CVS: S1, S2 heard. Regular rate and rhythm  LUNGS: Coarse breath sounds appreciated.  ABDOMEN: Soft, nontender to palpate. Positive bowel sounds. No guarding or rebound appreciated.  NEUROLOGICAL: No focal neurological deficits appreciated. Cranial nerves are grossly intact.  EXTREMITIES: Dorsalis pedis pulses can be appreciated. No edema appreciated.  SKIN:  Grossly intact, warm and dry.        Scheduled Medications  azithromycin, 500 mg, intravenous, Once      PRN Medications    Continuous Medications       Outpatient Medications:  Prior to Admission medications    Medication Sig Start Date End Date Taking? Authorizing Provider   acetaminophen (Tylenol) 325 mg tablet Take 500 mg by mouth every 6 hours if needed. 8/7/23   Historical Provider, MD   ascorbic acid, vitamin C, 500 mg capsule Take 1 capsule by mouth 2 times a day.    Historical Provider, MD   aspirin 81 mg chewable tablet Chew 1 tablet (81 mg) once daily.    Historical Provider, MD   atorvastatin (Lipitor) 80 mg tablet  Take 1 tablet (80 mg) by mouth once daily.    Historical Provider, MD   cholecalciferol (Vitamin D-3) 50 mcg (2,000 unit) capsule Take 1 capsule (50 mcg) by mouth early in the morning..    Historical Provider, MD   cyanocobalamin (Vitamin B-12) 500 mcg tablet Take 1 tablet (500 mcg) by mouth once daily.    Historical Provider, MD   dilTIAZem CD (Cardizem CD) 360 mg 24 hr capsule TAKE 1 CAPSULE BY MOUTH DAILY 3/22/24   Dev David MD   dilTIAZem LA (Cardizem LA) 300 mg 24 hr tablet Take 1 tablet (300 mg) by mouth once daily. 9/19/23   Historical Provider, MD   doxazosin (Cardura) 2 mg tablet Take 1 tablet (2 mg) by mouth once daily.    Historical Provider, MD   famotidine (Pepcid) 20 mg tablet Take 1 tablet (20 mg) by mouth 2 times a day.    Historical Provider, MD   folic acid 0.8 mg capsule Take 1 mg by mouth once daily.    Historical Provider, MD   gabapentin (Neurontin) 300 mg capsule Take 1 capsule (300 mg) by mouth 3 times a day.    Historical Provider, MD   hydrALAZINE (Apresoline) 25 mg tablet Take 1 tablet (25 mg) by mouth every 12 hours.    Historical Provider, MD       LABS AND IMAGING:     Labs:  Results for orders placed or performed during the hospital encounter of 01/31/25 (from the past 24 hours)   ECG 12 lead   Result Value Ref Range    Ventricular Rate 66 BPM    Atrial Rate 66 BPM    SC Interval 146 ms    QRS Duration 84 ms    QT Interval 370 ms    QTC Calculation(Bazett) 387 ms    P Axis 20 degrees    R Axis -26 degrees    T Axis 111 degrees    QRS Count 11 beats    Q Onset 227 ms    P Onset 154 ms    P Offset 207 ms    T Offset 412 ms    QTC Fredericia 382 ms   CBC and Auto Differential   Result Value Ref Range    WBC 10.3 4.4 - 11.3 x10*3/uL    nRBC 0.0 0.0 - 0.0 /100 WBCs    RBC 3.88 (L) 4.00 - 5.20 x10*6/uL    Hemoglobin 12.8 12.0 - 16.0 g/dL    Hematocrit 36.7 36.0 - 46.0 %    MCV 95 80 - 100 fL    MCH 33.0 26.0 - 34.0 pg    MCHC 34.9 32.0 - 36.0 g/dL    RDW 13.2 11.5 - 14.5 %     Platelets 221 150 - 450 x10*3/uL    Neutrophils % 87.9 40.0 - 80.0 %    Immature Granulocytes %, Automated 0.4 0.0 - 0.9 %    Lymphocytes % 5.5 13.0 - 44.0 %    Monocytes % 5.7 2.0 - 10.0 %    Eosinophils % 0.0 0.0 - 6.0 %    Basophils % 0.5 0.0 - 2.0 %    Neutrophils Absolute 9.07 (H) 1.60 - 5.50 x10*3/uL    Immature Granulocytes Absolute, Automated 0.04 0.00 - 0.50 x10*3/uL    Lymphocytes Absolute 0.57 (L) 0.80 - 3.00 x10*3/uL    Monocytes Absolute 0.59 0.05 - 0.80 x10*3/uL    Eosinophils Absolute 0.00 0.00 - 0.40 x10*3/uL    Basophils Absolute 0.05 0.00 - 0.10 x10*3/uL   Magnesium   Result Value Ref Range    Magnesium 1.82 1.60 - 2.40 mg/dL   Comprehensive metabolic panel   Result Value Ref Range    Glucose 102 (H) 74 - 99 mg/dL    Sodium 131 (L) 136 - 145 mmol/L    Potassium 4.7 3.5 - 5.3 mmol/L    Chloride 96 (L) 98 - 107 mmol/L    Bicarbonate 24 21 - 32 mmol/L    Anion Gap 16 10 - 20 mmol/L    Urea Nitrogen 28 (H) 6 - 23 mg/dL    Creatinine 1.06 (H) 0.50 - 1.05 mg/dL    eGFR 52 (L) >60 mL/min/1.73m*2    Calcium 9.6 8.6 - 10.3 mg/dL    Albumin 4.2 3.4 - 5.0 g/dL    Alkaline Phosphatase 70 33 - 136 U/L    Total Protein 7.0 6.4 - 8.2 g/dL    AST 21 9 - 39 U/L    Bilirubin, Total 0.6 0.0 - 1.2 mg/dL    ALT 13 7 - 45 U/L   Lactate   Result Value Ref Range    Lactate 0.9 0.4 - 2.0 mmol/L   Protime-INR   Result Value Ref Range    Protime 12.1 9.8 - 12.8 seconds    INR 1.1 0.9 - 1.1   Troponin I, High Sensitivity   Result Value Ref Range    Troponin I, High Sensitivity 90 (HH) 0 - 13 ng/L   Blood Culture    Specimen: Peripheral Venipuncture; Blood culture   Result Value Ref Range    Blood Culture Loaded on Instrument - Culture in progress    Sars-CoV-2 and Influenza A/B PCR   Result Value Ref Range    Flu A Result Not Detected Not Detected    Flu B Result Not Detected Not Detected    Coronavirus 2019, PCR Not Detected Not Detected   RSV PCR   Result Value Ref Range    RSV PCR Detected (A) Not Detected   Blood Culture     Specimen: Peripheral Venipuncture; Blood culture   Result Value Ref Range    Blood Culture Loaded on Instrument - Culture in progress    Troponin I, High Sensitivity   Result Value Ref Range    Troponin I, High Sensitivity 97 (HH) 0 - 13 ng/L   Urinalysis with Reflex Culture and Microscopic   Result Value Ref Range    Color, Urine Yellow Light-Yellow, Yellow, Dark-Yellow    Appearance, Urine Clear Clear    Specific Gravity, Urine 1.024 1.005 - 1.035    pH, Urine 6.0 5.0, 5.5, 6.0, 6.5, 7.0, 7.5, 8.0    Protein, Urine 20 (TRACE) NEGATIVE, 10 (TRACE), 20 (TRACE) mg/dL    Glucose, Urine Normal Normal mg/dL    Blood, Urine NEGATIVE NEGATIVE    Ketones, Urine TRACE (A) NEGATIVE mg/dL    Bilirubin, Urine NEGATIVE NEGATIVE    Urobilinogen, Urine Normal Normal mg/dL    Nitrite, Urine NEGATIVE NEGATIVE    Leukocyte Esterase, Urine NEGATIVE NEGATIVE   Urinalysis Microscopic   Result Value Ref Range    WBC, Urine NONE 1-5, NONE /HPF    RBC, Urine 6-10 (A) NONE, 1-2, 3-5 /HPF        Imaging:  CT angio chest for pulmonary embolism  Narrative: STUDY:  CT Angiogram of the Chest; 1/31/2025, 1407  INDICATION:  Shortness of breath.  COMPARISON:  XR chest 1/31/2025, 7/24/2023, 12/5/2020, 3/23/2020.  ACCESSION NUMBER(S):  [id number]  ORDERING CLINICIAN:  IJEOMA GOMEZ  TECHNIQUE:  CTA of the chest was performed with intravenous contrast.   Images are reviewed and processed at a workstation according to the CT  angiogram protocol with 3-D and/or MIP post processing imaging  generated.  Omnipaque 350, 75 mL was administered intravenously.  Automated mA/kV exposure control was utilized and patient examination  was performed in strict accordance with principles of ALARA.  FINDINGS:  Pulmonary arteries are adequately opacified without acute or chronic  filling defects.  The thoracic aorta is normal in course and caliber  without dissection or aneurysm.  The heart is normal in size without pericardial effusion.  Thoracic  lymph nodes  are not enlarged.  There is no pleural effusion, pleural thickening, or pneumothorax.   The airways are patent.  There is an opacity in the left upper lobe corresponding to the  density seen on the chest x-ray. This has the appearance of  consolidation and pneumonia rather than mass, though mass cannot be  excluded.  Upper abdomen demonstrates no acute pathology.  There are no acute fractures.  No suspicious bony lesions.  Impression: 1. No pulmonary emboli.  2. There is an opacity in the left upper lobe corresponding to the  density seen on the chest x-ray. This has the appearance of  consolidation and pneumonia rather than mass, though mass cannot be  excluded. Recommend follow-up chest CT following appropriate treatment  for pneumonia.  Signed by Yaya Olmedo MD  XR chest 1 view  Addendum: NOTIFICATION:  Non-critical findings were relayed by Radiology Support   Staff to CAROL Allison on 1/31/2025 at 1227.   Signed by Leroy Peraza MD  Narrative: STUDY:  Chest Radiograph;  1/31/25 at 11:41 AM  INDICATION:  Shortness of breath.  COMPARISON:  Chest XR 7/24/23.  ACCESSION NUMBER(S):  [id number]  ORDERING CLINICIAN:  IJEOMA GOMEZ  TECHNIQUE:  Frontal chest was obtained at 1140 hours.  FINDINGS:  Heart size is enlarged but unchanged.  No pleural effusions or  pneumothorax.  There is a new 3.1 cm mass found over the left the  midlung zone.  This is worrisome for underlying neoplasm.  A follow-up  chest CT is recommended for further evaluation.  Bones are unremarkable.  Impression: 1.  There is a new 3.1 cm opacity which projects over the left midlung  zone which which is concerning for underlying mass.  A follow-up chest  CT is recommended for further evaluation..  Signed by Leroy Peraza MD  ECG 12 lead  Normal sinus rhythm  Anteroseptal infarct (cited on or before 06-NOV-2006)  Abnormal ECG  When compared with ECG of 27-JUL-2023 10:06,  Nonspecific T wave abnormality, worse in Lateral  leads    See ED provider note for full interpretation and clinical correlation

## 2025-02-01 LAB
ANION GAP SERPL CALC-SCNC: 12 MMOL/L (ref 10–20)
BUN SERPL-MCNC: 42 MG/DL (ref 6–23)
CALCIUM SERPL-MCNC: 9.2 MG/DL (ref 8.6–10.3)
CHLORIDE SERPL-SCNC: 98 MMOL/L (ref 98–107)
CO2 SERPL-SCNC: 27 MMOL/L (ref 21–32)
CREAT SERPL-MCNC: 1.36 MG/DL (ref 0.5–1.05)
EGFRCR SERPLBLD CKD-EPI 2021: 38 ML/MIN/1.73M*2
ERYTHROCYTE [DISTWIDTH] IN BLOOD BY AUTOMATED COUNT: 13.2 % (ref 11.5–14.5)
GLUCOSE BLD MANUAL STRIP-MCNC: 180 MG/DL (ref 74–99)
GLUCOSE SERPL-MCNC: 121 MG/DL (ref 74–99)
HCT VFR BLD AUTO: 35.3 % (ref 36–46)
HGB BLD-MCNC: 11.9 G/DL (ref 12–16)
HOLD SPECIMEN: NORMAL
MCH RBC QN AUTO: 32.5 PG (ref 26–34)
MCHC RBC AUTO-ENTMCNC: 33.7 G/DL (ref 32–36)
MCV RBC AUTO: 96 FL (ref 80–100)
NRBC BLD-RTO: 0 /100 WBCS (ref 0–0)
PLATELET # BLD AUTO: 175 X10*3/UL (ref 150–450)
POTASSIUM SERPL-SCNC: 4 MMOL/L (ref 3.5–5.3)
RBC # BLD AUTO: 3.66 X10*6/UL (ref 4–5.2)
SODIUM SERPL-SCNC: 133 MMOL/L (ref 136–145)
WBC # BLD AUTO: 9.4 X10*3/UL (ref 4.4–11.3)

## 2025-02-01 PROCEDURE — 85027 COMPLETE CBC AUTOMATED: CPT | Performed by: HOSPITALIST

## 2025-02-01 PROCEDURE — 80048 BASIC METABOLIC PNL TOTAL CA: CPT | Performed by: HOSPITALIST

## 2025-02-01 PROCEDURE — 1200000002 HC GENERAL ROOM WITH TELEMETRY DAILY

## 2025-02-01 PROCEDURE — 2500000004 HC RX 250 GENERAL PHARMACY W/ HCPCS (ALT 636 FOR OP/ED): Performed by: HOSPITALIST

## 2025-02-01 PROCEDURE — 99232 SBSQ HOSP IP/OBS MODERATE 35: CPT | Performed by: HOSPITALIST

## 2025-02-01 PROCEDURE — 36415 COLL VENOUS BLD VENIPUNCTURE: CPT | Performed by: HOSPITALIST

## 2025-02-01 PROCEDURE — 82947 ASSAY GLUCOSE BLOOD QUANT: CPT

## 2025-02-01 PROCEDURE — 94640 AIRWAY INHALATION TREATMENT: CPT

## 2025-02-01 PROCEDURE — 2500000002 HC RX 250 W HCPCS SELF ADMINISTERED DRUGS (ALT 637 FOR MEDICARE OP, ALT 636 FOR OP/ED): Performed by: HOSPITALIST

## 2025-02-01 PROCEDURE — 2500000001 HC RX 250 WO HCPCS SELF ADMINISTERED DRUGS (ALT 637 FOR MEDICARE OP): Performed by: HOSPITALIST

## 2025-02-01 PROCEDURE — 2500000005 HC RX 250 GENERAL PHARMACY W/O HCPCS: Performed by: HOSPITALIST

## 2025-02-01 RX ORDER — IPRATROPIUM BROMIDE AND ALBUTEROL SULFATE 2.5; .5 MG/3ML; MG/3ML
3 SOLUTION RESPIRATORY (INHALATION) 3 TIMES DAILY
Status: DISPENSED | OUTPATIENT
Start: 2025-02-02

## 2025-02-01 RX ORDER — IPRATROPIUM BROMIDE AND ALBUTEROL SULFATE 2.5; .5 MG/3ML; MG/3ML
3 SOLUTION RESPIRATORY (INHALATION) EVERY 6 HOURS PRN
Status: ACTIVE | OUTPATIENT
Start: 2025-02-01

## 2025-02-01 RX ADMIN — ASPIRIN 81 MG: 81 TABLET, CHEWABLE ORAL at 08:07

## 2025-02-01 RX ADMIN — Medication 3 L/MIN: at 06:56

## 2025-02-01 RX ADMIN — IPRATROPIUM BROMIDE AND ALBUTEROL SULFATE 3 ML: .5; 3 SOLUTION RESPIRATORY (INHALATION) at 01:16

## 2025-02-01 RX ADMIN — ENOXAPARIN SODIUM 40 MG: 40 INJECTION SUBCUTANEOUS at 17:45

## 2025-02-01 RX ADMIN — ATORVASTATIN CALCIUM 80 MG: 80 TABLET, FILM COATED ORAL at 08:08

## 2025-02-01 RX ADMIN — IPRATROPIUM BROMIDE AND ALBUTEROL SULFATE 3 ML: .5; 3 SOLUTION RESPIRATORY (INHALATION) at 06:56

## 2025-02-01 RX ADMIN — FOLIC ACID 0.5 MG: 1 TABLET ORAL at 08:08

## 2025-02-01 RX ADMIN — PREDNISONE 20 MG: 20 TABLET ORAL at 08:08

## 2025-02-01 RX ADMIN — GABAPENTIN 300 MG: 300 CAPSULE ORAL at 20:16

## 2025-02-01 RX ADMIN — IPRATROPIUM BROMIDE AND ALBUTEROL SULFATE 3 ML: .5; 3 SOLUTION RESPIRATORY (INHALATION) at 12:37

## 2025-02-01 RX ADMIN — CEFTRIAXONE SODIUM 1 G: 1 INJECTION, SOLUTION INTRAVENOUS at 17:46

## 2025-02-01 RX ADMIN — HYDRALAZINE HYDROCHLORIDE 25 MG: 25 TABLET ORAL at 17:45

## 2025-02-01 RX ADMIN — Medication 1 TABLET: at 20:15

## 2025-02-01 RX ADMIN — Medication 1 TABLET: at 08:07

## 2025-02-01 RX ADMIN — DILTIAZEM HYDROCHLORIDE 300 MG: 300 CAPSULE, COATED, EXTENDED RELEASE ORAL at 08:07

## 2025-02-01 RX ADMIN — IPRATROPIUM BROMIDE AND ALBUTEROL SULFATE 3 ML: .5; 3 SOLUTION RESPIRATORY (INHALATION) at 19:58

## 2025-02-01 RX ADMIN — HYDRALAZINE HYDROCHLORIDE 25 MG: 25 TABLET ORAL at 06:01

## 2025-02-01 RX ADMIN — GABAPENTIN 300 MG: 300 CAPSULE ORAL at 08:08

## 2025-02-01 RX ADMIN — GABAPENTIN 300 MG: 300 CAPSULE ORAL at 17:45

## 2025-02-01 RX ADMIN — Medication 3 L/MIN: at 07:23

## 2025-02-01 RX ADMIN — BUPROPION HYDROCHLORIDE 150 MG: 150 TABLET, EXTENDED RELEASE ORAL at 06:01

## 2025-02-01 RX ADMIN — AZITHROMYCIN DIHYDRATE 500 MG: 250 TABLET, FILM COATED ORAL at 08:08

## 2025-02-01 RX ADMIN — DOXAZOSIN 2 MG: 1 TABLET ORAL at 08:07

## 2025-02-01 ASSESSMENT — COGNITIVE AND FUNCTIONAL STATUS - GENERAL
HELP NEEDED FOR BATHING: A LITTLE
CLIMB 3 TO 5 STEPS WITH RAILING: A LOT
TURNING FROM BACK TO SIDE WHILE IN FLAT BAD: A LITTLE
STANDING UP FROM CHAIR USING ARMS: A LOT
MOBILITY SCORE: 20
WALKING IN HOSPITAL ROOM: A LOT
TOILETING: A LITTLE
MOVING TO AND FROM BED TO CHAIR: A LOT
STANDING UP FROM CHAIR USING ARMS: A LITTLE
HELP NEEDED FOR BATHING: A LITTLE
DAILY ACTIVITIY SCORE: 20
DAILY ACTIVITIY SCORE: 20
WALKING IN HOSPITAL ROOM: A LITTLE
CLIMB 3 TO 5 STEPS WITH RAILING: A LITTLE
DRESSING REGULAR LOWER BODY CLOTHING: A LITTLE
MOVING TO AND FROM BED TO CHAIR: A LITTLE
DRESSING REGULAR LOWER BODY CLOTHING: A LITTLE
DRESSING REGULAR UPPER BODY CLOTHING: A LITTLE
DRESSING REGULAR UPPER BODY CLOTHING: A LITTLE
MOBILITY SCORE: 15
TOILETING: A LITTLE

## 2025-02-01 ASSESSMENT — PAIN SCALES - GENERAL
PAINLEVEL_OUTOF10: 0 - NO PAIN
PAINLEVEL_OUTOF10: 0 - NO PAIN

## 2025-02-01 ASSESSMENT — PAIN - FUNCTIONAL ASSESSMENT: PAIN_FUNCTIONAL_ASSESSMENT: 0-10

## 2025-02-01 NOTE — NURSING NOTE
"Wake patient to administer scheduled oral medications. Patient states, \"Who in the world wakes people at six o'clock in the morning to take pills?\"  "

## 2025-02-01 NOTE — CARE PLAN
The patient's goals for the shift include      The clinical goals for the shift include sp02 >92%

## 2025-02-02 VITALS
HEIGHT: 61 IN | OXYGEN SATURATION: 93 % | BODY MASS INDEX: 26.56 KG/M2 | SYSTOLIC BLOOD PRESSURE: 127 MMHG | TEMPERATURE: 98.1 F | WEIGHT: 140.65 LBS | HEART RATE: 53 BPM | RESPIRATION RATE: 17 BRPM | DIASTOLIC BLOOD PRESSURE: 60 MMHG

## 2025-02-02 LAB
BACTERIA BLD CULT: NORMAL
BACTERIA BLD CULT: NORMAL

## 2025-02-02 PROCEDURE — 94640 AIRWAY INHALATION TREATMENT: CPT

## 2025-02-02 PROCEDURE — 2500000002 HC RX 250 W HCPCS SELF ADMINISTERED DRUGS (ALT 637 FOR MEDICARE OP, ALT 636 FOR OP/ED): Performed by: HOSPITALIST

## 2025-02-02 PROCEDURE — 1200000002 HC GENERAL ROOM WITH TELEMETRY DAILY

## 2025-02-02 PROCEDURE — 2500000002 HC RX 250 W HCPCS SELF ADMINISTERED DRUGS (ALT 637 FOR MEDICARE OP, ALT 636 FOR OP/ED): Performed by: STUDENT IN AN ORGANIZED HEALTH CARE EDUCATION/TRAINING PROGRAM

## 2025-02-02 PROCEDURE — 2500000004 HC RX 250 GENERAL PHARMACY W/ HCPCS (ALT 636 FOR OP/ED): Performed by: HOSPITALIST

## 2025-02-02 PROCEDURE — 94760 N-INVAS EAR/PLS OXIMETRY 1: CPT

## 2025-02-02 PROCEDURE — 99239 HOSP IP/OBS DSCHRG MGMT >30: CPT | Performed by: HOSPITALIST

## 2025-02-02 PROCEDURE — 2500000001 HC RX 250 WO HCPCS SELF ADMINISTERED DRUGS (ALT 637 FOR MEDICARE OP): Performed by: HOSPITALIST

## 2025-02-02 RX ORDER — AZITHROMYCIN 500 MG/1
500 TABLET, FILM COATED ORAL DAILY
Qty: 3 TABLET | Refills: 0 | Status: SHIPPED | OUTPATIENT
Start: 2025-02-02 | End: 2025-02-05

## 2025-02-02 RX ORDER — PREDNISONE 20 MG/1
20 TABLET ORAL DAILY
Qty: 4 TABLET | Refills: 0 | Status: SHIPPED | OUTPATIENT
Start: 2025-02-02 | End: 2025-02-06

## 2025-02-02 RX ORDER — GUAIFENESIN 600 MG/1
600 TABLET, EXTENDED RELEASE ORAL 2 TIMES DAILY PRN
Qty: 30 TABLET | Refills: 0 | Status: SHIPPED | OUTPATIENT
Start: 2025-02-02

## 2025-02-02 RX ORDER — CEFUROXIME AXETIL 250 MG/1
250 TABLET ORAL 2 TIMES DAILY
Qty: 6 TABLET | Refills: 0 | Status: SHIPPED | OUTPATIENT
Start: 2025-02-02 | End: 2025-02-05

## 2025-02-02 RX ADMIN — HYDRALAZINE HYDROCHLORIDE 25 MG: 25 TABLET ORAL at 16:07

## 2025-02-02 RX ADMIN — ATORVASTATIN CALCIUM 80 MG: 80 TABLET, FILM COATED ORAL at 10:14

## 2025-02-02 RX ADMIN — Medication 1 TABLET: at 21:06

## 2025-02-02 RX ADMIN — GABAPENTIN 300 MG: 300 CAPSULE ORAL at 10:13

## 2025-02-02 RX ADMIN — FOLIC ACID 0.5 MG: 1 TABLET ORAL at 10:13

## 2025-02-02 RX ADMIN — IPRATROPIUM BROMIDE AND ALBUTEROL SULFATE 3 ML: .5; 3 SOLUTION RESPIRATORY (INHALATION) at 20:29

## 2025-02-02 RX ADMIN — GABAPENTIN 300 MG: 300 CAPSULE ORAL at 16:07

## 2025-02-02 RX ADMIN — Medication 1 TABLET: at 10:13

## 2025-02-02 RX ADMIN — HYDRALAZINE HYDROCHLORIDE 25 MG: 25 TABLET ORAL at 05:20

## 2025-02-02 RX ADMIN — AZITHROMYCIN DIHYDRATE 500 MG: 250 TABLET, FILM COATED ORAL at 10:13

## 2025-02-02 RX ADMIN — ENOXAPARIN SODIUM 40 MG: 40 INJECTION SUBCUTANEOUS at 16:07

## 2025-02-02 RX ADMIN — CEFTRIAXONE SODIUM 1 G: 1 INJECTION, SOLUTION INTRAVENOUS at 16:08

## 2025-02-02 RX ADMIN — BUPROPION HYDROCHLORIDE 150 MG: 150 TABLET, EXTENDED RELEASE ORAL at 06:04

## 2025-02-02 RX ADMIN — ASPIRIN 81 MG: 81 TABLET, CHEWABLE ORAL at 10:13

## 2025-02-02 RX ADMIN — DOXAZOSIN 2 MG: 1 TABLET ORAL at 12:54

## 2025-02-02 RX ADMIN — GABAPENTIN 300 MG: 300 CAPSULE ORAL at 21:06

## 2025-02-02 RX ADMIN — IPRATROPIUM BROMIDE AND ALBUTEROL SULFATE 3 ML: .5; 3 SOLUTION RESPIRATORY (INHALATION) at 06:51

## 2025-02-02 RX ADMIN — PREDNISONE 20 MG: 20 TABLET ORAL at 10:14

## 2025-02-02 ASSESSMENT — COGNITIVE AND FUNCTIONAL STATUS - GENERAL
DRESSING REGULAR LOWER BODY CLOTHING: A LOT
WALKING IN HOSPITAL ROOM: A LITTLE
MOBILITY SCORE: 19
HELP NEEDED FOR BATHING: A LOT
CLIMB 3 TO 5 STEPS WITH RAILING: A LOT
TOILETING: A LOT
WALKING IN HOSPITAL ROOM: A LITTLE
DRESSING REGULAR UPPER BODY CLOTHING: A LITTLE
CLIMB 3 TO 5 STEPS WITH RAILING: A LITTLE
DAILY ACTIVITIY SCORE: 21
DRESSING REGULAR LOWER BODY CLOTHING: A LITTLE
TOILETING: A LITTLE
MOBILITY SCORE: 22
DAILY ACTIVITIY SCORE: 17
MOVING TO AND FROM BED TO CHAIR: A LITTLE
STANDING UP FROM CHAIR USING ARMS: A LITTLE
HELP NEEDED FOR BATHING: A LITTLE

## 2025-02-02 ASSESSMENT — PAIN SCALES - GENERAL
PAINLEVEL_OUTOF10: 0 - NO PAIN
PAINLEVEL_OUTOF10: 0 - NO PAIN

## 2025-02-02 ASSESSMENT — PAIN - FUNCTIONAL ASSESSMENT: PAIN_FUNCTIONAL_ASSESSMENT: 0-10

## 2025-02-02 NOTE — CARE PLAN
The patient's goals for the shift include Labs WNL    The clinical goals for the shift include Labs WNL    Problem: Pain - Adult  Goal: Verbalizes/displays adequate comfort level or baseline comfort level  Outcome: Progressing     Problem: Discharge Planning  Goal: Discharge to home or other facility with appropriate resources  Outcome: Progressing     Problem: Chronic Conditions and Co-morbidities  Goal: Patient's chronic conditions and co-morbidity symptoms are monitored and maintained or improved  Outcome: Progressing     Problem: Nutrition  Goal: Nutrient intake appropriate for maintaining nutritional needs  Outcome: Progressing     Problem: Skin  Goal: Participates in plan/prevention/treatment measures  Outcome: Progressing  Goal: Prevent/manage excess moisture  Outcome: Progressing  Goal: Prevent/minimize sheer/friction injuries  Outcome: Progressing  Goal: Promote/optimize nutrition  Outcome: Progressing  Goal: Promote skin healing  Outcome: Progressing     Problem: Fall/Injury  Goal: Not fall by end of shift  Outcome: Progressing  Goal: Be free from injury by end of the shift  Outcome: Progressing  Goal: Verbalize understanding of personal risk factors for fall in the hospital  Outcome: Progressing  Goal: Verbalize understanding of risk factor reduction measures to prevent injury from fall in the home  Outcome: Progressing  Goal: Use assistive devices by end of the shift  Outcome: Progressing  Goal: Pace activities to prevent fatigue by end of the shift  Outcome: Progressing     Problem: Respiratory  Goal: Clear secretions with interventions this shift  Outcome: Progressing  Goal: Minimal/no exertional discomfort or dyspnea this shift  Outcome: Progressing  Goal: No signs of respiratory distress (eg. Use of accessory muscles. Peds grunting)  Outcome: Progressing  Goal: Patent airway maintained this shift  Outcome: Progressing  Goal: Verbalize decreased shortness of breath this shift  Outcome:  Progressing  Goal: Wean oxygen to maintain O2 saturation per order/standard this shift  Outcome: Progressing     Problem: Isolation  Goal: No cross contamination to visitors, employees, or other residents  Outcome: Progressing     Problem: Pain  Goal: Takes deep breaths with improved pain control throughout the shift  Outcome: Progressing  Goal: Turns in bed with improved pain control throughout the shift  Outcome: Progressing  Goal: Walks with improved pain control throughout the shift  Outcome: Progressing  Goal: Performs ADL's with improved pain control throughout shift  Outcome: Progressing  Goal: Participates in PT with improved pain control throughout the shift  Outcome: Progressing  Goal: Free from opioid side effects throughout the shift  Outcome: Progressing  Goal: Free from acute confusion related to pain meds throughout the shift  Outcome: Progressing

## 2025-02-02 NOTE — DISCHARGE SUMMARY
DISCHARGE DIAGNOSIS     Acute hypoxic respiratory failure  RSV bronchitis  Community-acquired pneumonia  Hypertension    HOSPITAL COURSE AND DETAILS     Ms. Child  is a 84 y.o. female with a significant past medical history of hypertension, CKD stage III, hyperlipidemia, who presented to [facility] for lysed weakness.     She was seen at [facility] ER yesterday for cough, congestion and generalized weakness.  She was found to have RSV bronchitis and discharged home with a medrol dose pack and Tessalon Perles.       She continued to experience symptoms which prompted her to come back to the emergency room.     Here her CT angio of chest showed a left lower lobe opacity.  She was noted to be hypoxic.  Requiring 2 L of oxygen, she was admitted for RSV bronchitis and community-acquired pneumonia.  She was placed on ceftriaxone and azithromycin.  She continued to overall improve and is weaned off oxygen.  On discharge, she was given 4 days of prednisone and azithromycin and cefuroxime.    She did have mild renal insufficiency, during her hospitalization her diuretics were held.  She will have a repeat BMP in 1 week.    She was advised to follow-up with her primary care doctor as outpatient      Total time spent on discharge planning and coordination of care 40 minutes.  Discharge planning was discussed with patient, she understands and agrees with plan of care.    * Some of these notes were completed using Dragon voice recognition technology and may include unintended areas with respect to translation of word, typographical errors or primary areas which may not have been identified prior to finalization of the document.          DISCHARGE PHYSICAL EXAM     Last Recorded Vitals:  Vitals:    02/02/25 0518 02/02/25 0541 02/02/25 0651 02/02/25 0727   BP: 132/60   118/58   BP Location:    Left arm   Patient Position: Lying   Lying   Pulse: 62   57   Resp: 16   16   Temp: 36.8 °C (98.2 °F)   36.3 °C (97.3 °F)    TempSrc: Temporal   Temporal   SpO2: 96%  97% 91%   Weight:  63.8 kg (140 lb 10.5 oz)     Height:           Physical Exam  PHYSICAL EXAM:   GENERAL: Laying in bed, does not appear to be in any distress.   HEENT: HEAD: Normocephalic atraumatic.  Neck: Supple.  Eyes: Pupils are reactive to direct light.   CVS: S1, S2 heard. Regular rate and rhythm  LUNGS: Clear to auscultate bilaterally. No wheezing or rhonchi appreciated.  ABDOMEN: Soft, nontender to palpate. Positive bowel sounds. No guarding or rebound appreciated.  NEUROLOGICAL: No focal neurological deficits appreciated. Cranial nerves are grossly intact.  EXTREMITIES: Dorsalis pedis pulses can be appreciated. No edema appreciated.  SKIN:  Grossly intact, warm and dry.    DISCHARGE MEDICATIONS        Your medication list        START taking these medications        Instructions Last Dose Given Next Dose Due   guaiFENesin 600 mg 12 hr tablet  Commonly known as: Mucinex      Take 1 tablet (600 mg) by mouth 2 times a day as needed for cough or congestion. Do not crush, chew, or split.       predniSONE 20 mg tablet  Commonly known as: Deltasone      Take 1 tablet (20 mg) by mouth once daily for 4 days.              CHANGE how you take these medications        Instructions Last Dose Given Next Dose Due   dilTIAZem  mg 24 hr tablet  Commonly known as: Cardizem LA  What changed: Another medication with the same name was removed. Continue taking this medication, and follow the directions you see here.           Cartia  mg 24 hr capsule  Generic drug: dilTIAZem CD  What changed: Another medication with the same name was removed. Continue taking this medication, and follow the directions you see here.                  CONTINUE taking these medications        Instructions Last Dose Given Next Dose Due   acetaminophen 325 mg tablet  Commonly known as: Tylenol           albuterol 90 mcg/actuation inhaler           ascorbic acid (vitamin C) 500 mg capsule            aspirin 81 mg chewable tablet           atorvastatin 80 mg tablet  Commonly known as: Lipitor           benzonatate 100 mg capsule  Commonly known as: Tessalon           buPROPion  mg 24 hr tablet  Commonly known as: Wellbutrin XL           cholecalciferol 50 mcg (2,000 unit) capsule  Commonly known as: Vitamin D-3           doxazosin 2 mg tablet  Commonly known as: Cardura           gabapentin 300 mg capsule  Commonly known as: Neurontin           hydrALAZINE 25 mg tablet  Commonly known as: Apresoline           losartan-hydrochlorothiazide 100-25 mg tablet  Commonly known as: Hyzaar           methocarbamol 500 mg tablet  Commonly known as: Robaxin                  STOP taking these medications      methylPREDNISolone 4 mg tablets  Commonly known as: Medrol Dospak                  Where to Get Your Medications        These medications were sent to [facility]/pharmacy #[id number] - [place], [place] - [address] [place] [address] AT CORNER OF [address] [id number]      Phone: [phone]   guaiFENesin 600 mg 12 hr tablet  predniSONE 20 mg tablet           OUTPATIENT FOLLOW-UP     No future appointments.

## 2025-02-02 NOTE — CARE PLAN
The patient's goals for the shift include Labs WNL    The clinical goals for the shift include Labs WNL      Problem: Pain - Adult  Goal: Verbalizes/displays adequate comfort level or baseline comfort level  Outcome: Progressing     Problem: Discharge Planning  Goal: Discharge to home or other facility with appropriate resources  Outcome: Progressing     Problem: Chronic Conditions and Co-morbidities  Goal: Patient's chronic conditions and co-morbidity symptoms are monitored and maintained or improved  Outcome: Progressing     Problem: Nutrition  Goal: Nutrient intake appropriate for maintaining nutritional needs  Outcome: Progressing     Problem: Skin  Goal: Participates in plan/prevention/treatment measures  2/2/2025 0444 by Radha Rojas RN  Outcome: Progressing  2/1/2025 1921 by Radha Rojas RN  Flowsheets (Taken 2/1/2025 1921)  Participates in plan/prevention/treatment measures:   Increase activity/out of bed for meals   Discuss with provider PT/OT consult   Elevate heels  Goal: Prevent/manage excess moisture  2/2/2025 0444 by Radha Rojas RN  Outcome: Progressing  2/1/2025 1921 by Radha Rojas RN  Flowsheets (Taken 2/1/2025 1921)  Prevent/manage excess moisture:   Use wicking fabric (obtain order)   Moisturize dry skin   Cleanse incontinence/protect with barrier cream   Monitor for/manage infection if present   Follow provider orders for dressing changes  Goal: Prevent/minimize sheer/friction injuries  2/2/2025 0444 by Radha Rojas RN  Outcome: Progressing  2/1/2025 1921 by Radha Rojas RN  Flowsheets (Taken 2/1/2025 1921)  Prevent/minimize sheer/friction injuries:   Utilize specialty bed per algorithm   Use pull sheet   Turn/reposition every 2 hours/use positioning/transfer devices   Increase activity/out of bed for meals   HOB 30 degrees or less   Complete micro-shifts as needed if patient unable. Adjust patient position to relieve pressure points, not a full turn  Goal:  Promote/optimize nutrition  2/2/2025 0444 by Radha Rojas RN  Outcome: Progressing  2/1/2025 1921 by Radha Rojas RN  Flowsheets (Taken 2/1/2025 1921)  Promote/optimize nutrition:   Offer water/supplements/favorite foods   Discuss with provider if NPO > 2 days   Assist with feeding   Reassess MST if dietician not consulted   Consume > 50% meals/supplements   Monitor/record intake including meals  Goal: Promote skin healing  2/2/2025 0444 by Radha Rojas RN  Outcome: Progressing  2/1/2025 1921 by Radha Rojas RN  Flowsheets (Taken 2/1/2025 1921)  Promote skin healing:   Turn/reposition every 2 hours/use positioning/transfer devices   Rotate device position/do not position patient on device   Protective dressings over bony prominences   Ensure correct size (line/device) and apply per  instructions   Assess skin/pad under line(s)/device(s)     Problem: Fall/Injury  Goal: Not fall by end of shift  Outcome: Progressing  Goal: Be free from injury by end of the shift  Outcome: Progressing  Goal: Verbalize understanding of personal risk factors for fall in the hospital  Outcome: Progressing  Goal: Verbalize understanding of risk factor reduction measures to prevent injury from fall in the home  Outcome: Progressing  Goal: Use assistive devices by end of the shift  Outcome: Progressing  Goal: Pace activities to prevent fatigue by end of the shift  Outcome: Progressing     Problem: Respiratory  Goal: Clear secretions with interventions this shift  Outcome: Progressing  Goal: Minimal/no exertional discomfort or dyspnea this shift  Outcome: Progressing  Goal: No signs of respiratory distress (eg. Use of accessory muscles. Peds grunting)  Outcome: Progressing  Goal: Patent airway maintained this shift  Outcome: Progressing  Goal: Verbalize decreased shortness of breath this shift  Outcome: Progressing  Goal: Wean oxygen to maintain O2 saturation per order/standard this shift  Outcome:  Progressing     Problem: Isolation  Goal: No cross contamination to visitors, employees, or other residents  Outcome: Progressing     Problem: Pain  Goal: Takes deep breaths with improved pain control throughout the shift  Outcome: Progressing  Goal: Turns in bed with improved pain control throughout the shift  Outcome: Progressing  Goal: Walks with improved pain control throughout the shift  Outcome: Progressing  Goal: Performs ADL's with improved pain control throughout shift  Outcome: Progressing  Goal: Participates in PT with improved pain control throughout the shift  Outcome: Progressing  Goal: Free from opioid side effects throughout the shift  Outcome: Progressing  Goal: Free from acute confusion related to pain meds throughout the shift  Outcome: Progressing

## 2025-02-02 NOTE — PROGRESS NOTES
"PROGRESS NOTE    ASSESSMENT AND PLAN:     Acute hypoxic respiratory failure  RSV Bronchitis  Community-acquired pneumonia  -Presented to the emergency room with worsening shortness of breath, cough congestion  -Noted to be hypoxic on admission requiring 2 L of oxygen  -CT angio chest showed left lower lobe     Plan:  -Continue prednisone  -Continue ceftriaxone and Azithromycin   -Continue antitussive medication        Hypertension  - Hold antihypertensive for now    5.  Mild renal insufficiency  -Continue to monitor        SUBJECTIVE:   Admit Date: 1/31/2025    Interval History: Feels okay, no active complaints      OBJECTIVE:   Vitals: /60   Pulse 59   Temp 37.4 °C (99.3 °F)   Resp 16   Ht 1.549 m (5' 1\")   Wt 63.8 kg (140 lb 10.5 oz)   SpO2 90%   BMI 26.58 kg/m²    Wt Readings from Last 3 Encounters:   02/02/25 63.8 kg (140 lb 10.5 oz)   11/20/23 76.8 kg (169 lb 4.8 oz)   02/21/23 81.6 kg (180 lb)      24HR INTAKE/OUTPUT:    Intake/Output Summary (Last 24 hours) at 2/2/2025 1607  Last data filed at 2/2/2025 0403  Gross per 24 hour   Intake 170 ml   Output 150 ml   Net 20 ml       PHYSICAL EXAM:   GENERAL: Laying in bed, does not appear to be in any distress.   HEENT: HEAD: Normocephalic atraumatic.  Neck: Supple.  Eyes: Pupils are reactive to direct light.   CVS: S1, S2 heard. Regular rate and rhythm  LUNGS: Clear to auscultate bilaterally. No wheezing or rhonchi appreciated.  ABDOMEN: Soft, nontender to palpate. Positive bowel sounds. No guarding or rebound appreciated.  NEUROLOGICAL: No focal neurological deficits appreciated. Cranial nerves are grossly intact.  EXTREMITIES: No edema appreciated.  SKIN:  Grossly intact, warm and dry.      LABS/IMAGING AND MEDICATIONS:   Scheduled Meds:aspirin, 81 mg, oral, Daily  atorvastatin, 80 mg, oral, Daily  azithromycin, 500 mg, oral, q24h NED  buPROPion XL, 150 mg, oral, Daily  cefTRIAXone, 1 g, intravenous, q24h  dilTIAZem CD, 300 mg, oral, Daily  doxazosin, " "2 mg, oral, Daily  enoxaparin, 40 mg, subcutaneous, q24h  folic acid, 0.5 mg, oral, Daily  gabapentin, 300 mg, oral, TID  hydrALAZINE, 25 mg, oral, q12h  ipratropium-albuteroL, 3 mL, nebulization, TID  lactobacillus acidophilus, 1 tablet, oral, BID  predniSONE, 20 mg, oral, Daily      PRN Meds:PRN medications: benzonatate, ipratropium-albuteroL, methocarbamol    No lab exists for component: \"CBC\"   No lab exists for component: \"CMP\"   No lab exists for component: \"TROPONIN\"      Results from last 7 days   Lab Units 01/31/25  1032   INR  1.1     No lab exists for component: \"LIPIDS\"       No lab exists for component: \"URINALYSIS\"          BMP:  Results from last 7 days   Lab Units 02/01/25  0812 01/31/25  1032   SODIUM mmol/L 133* 131*   POTASSIUM mmol/L 4.0 4.7   CHLORIDE mmol/L 98 96*   CO2 mmol/L 27 24   BUN mg/dL 42* 28*   CREATININE mg/dL 1.36* 1.06*       CBC:  Results from last 7 days   Lab Units 02/01/25  0812 01/31/25  1032   WBC AUTO x10*3/uL 9.4 10.3   RBC AUTO x10*6/uL 3.66* 3.88*   HEMOGLOBIN g/dL 11.9* 12.8   HEMATOCRIT % 35.3* 36.7   MCV fL 96 95   MCH pg 32.5 33.0   MCHC g/dL 33.7 34.9   RDW % 13.2 13.2   PLATELETS AUTO x10*3/uL 175 221       Cardiac Enzymes:   Results from last 7 days   Lab Units 01/31/25  1118 01/31/25  1032   TROPHS ng/L 97* 90*         Hepatic Function Panel:  Results from last 7 days   Lab Units 01/31/25  1032   ALK PHOS U/L 70   ALT U/L 13   AST U/L 21   PROTEIN TOTAL g/dL 7.0   BILIRUBIN TOTAL mg/dL 0.6       Magnesium:  Results from last 7 days   Lab Units 01/31/25  1032   MAGNESIUM mg/dL 1.82       Pro-BNP:  No results found for: \"PROBNP\"    INR:  Results from last 7 days   Lab Units 01/31/25  1032   PROTIME seconds 12.1   INR  1.1         CMP:  Results from last 7 days   Lab Units 02/01/25  0812 01/31/25  1032   SODIUM mmol/L 133* 131*   POTASSIUM mmol/L 4.0 4.7   CHLORIDE mmol/L 98 96*   CO2 mmol/L 27 24   BUN mg/dL 42* 28*   CREATININE mg/dL 1.36* 1.06*   GLUCOSE mg/dL " 121* 102*   CALCIUM mg/dL 9.2 9.6   PROTEIN TOTAL g/dL  --  7.0   BILIRUBIN TOTAL mg/dL  --  0.6   ALK PHOS U/L  --  70   AST U/L  --  21   ALT U/L  --  13

## 2025-02-02 NOTE — DISCHARGE INSTRUCTIONS
It was nice caring for you during your stay at [facility]. As we discussed,  -Please continue prednisone for 4 more days, please continue Tessalon Perles and Mucinex as needed    Wishing you a healthy recovery!

## 2025-02-02 NOTE — PROGRESS NOTES
"PROGRESS NOTE    ASSESSMENT AND PLAN:     Acute hypoxic respiratory failure  RSV Bronchitis  Community-acquired pneumonia  -Presented to the emergency room with worsening shortness of breath, cough congestion  -Noted to be hypoxic on admission requiring 2 L of oxygen  -CT angio chest showed left lower lobe     Plan:  -Continue prednisone  -Continue ceftriaxone and Azithromycin   -Continue antitussive medication        Hypertension  - Hold antihypertensive for now.        VTE Prophylaxis: Lovenox    SUBJECTIVE:   Admit Date: 1/31/2025    Interval History: Feels better today, but admits to feeling weak.      OBJECTIVE:   Vitals: /60   Pulse 59   Temp 37.4 °C (99.3 °F)   Resp 16   Ht 1.549 m (5' 1\")   Wt 63.8 kg (140 lb 10.5 oz)   SpO2 90%   BMI 26.58 kg/m²    Wt Readings from Last 3 Encounters:   02/02/25 63.8 kg (140 lb 10.5 oz)   11/20/23 76.8 kg (169 lb 4.8 oz)   02/21/23 81.6 kg (180 lb)      24HR INTAKE/OUTPUT:    Intake/Output Summary (Last 24 hours) at 2/2/2025 1604  Last data filed at 2/2/2025 0403  Gross per 24 hour   Intake 170 ml   Output 150 ml   Net 20 ml       PHYSICAL EXAM:   GENERAL: Laying in bed, does not appear to be in any distress.   HEENT: HEAD: Normocephalic atraumatic.  Neck: Supple.  Eyes: Pupils are reactive to direct light.   CVS: S1, S2 heard. Regular rate and rhythm  LUNGS: Clear to auscultate bilaterally. No wheezing or rhonchi appreciated.  ABDOMEN: Soft, nontender to palpate. Positive bowel sounds. No guarding or rebound appreciated.  NEUROLOGICAL: No focal neurological deficits appreciated. Cranial nerves are grossly intact.  EXTREMITIES: No edema appreciated.  SKIN:  Grossly intact, warm and dry.      LABS/IMAGING AND MEDICATIONS:   Scheduled Meds:aspirin, 81 mg, oral, Daily  atorvastatin, 80 mg, oral, Daily  azithromycin, 500 mg, oral, q24h NED  buPROPion XL, 150 mg, oral, Daily  cefTRIAXone, 1 g, intravenous, q24h  dilTIAZem CD, 300 mg, oral, Daily  doxazosin, 2 mg, " "oral, Daily  enoxaparin, 40 mg, subcutaneous, q24h  folic acid, 0.5 mg, oral, Daily  gabapentin, 300 mg, oral, TID  hydrALAZINE, 25 mg, oral, q12h  ipratropium-albuteroL, 3 mL, nebulization, TID  lactobacillus acidophilus, 1 tablet, oral, BID  predniSONE, 20 mg, oral, Daily      PRN Meds:PRN medications: benzonatate, ipratropium-albuteroL, methocarbamol    No lab exists for component: \"CBC\"   No lab exists for component: \"CMP\"   No lab exists for component: \"TROPONIN\"      Results from last 7 days   Lab Units 01/31/25  1032   INR  1.1     No lab exists for component: \"LIPIDS\"       No lab exists for component: \"URINALYSIS\"          BMP:  Results from last 7 days   Lab Units 02/01/25  0812 01/31/25  1032   SODIUM mmol/L 133* 131*   POTASSIUM mmol/L 4.0 4.7   CHLORIDE mmol/L 98 96*   CO2 mmol/L 27 24   BUN mg/dL 42* 28*   CREATININE mg/dL 1.36* 1.06*       CBC:  Results from last 7 days   Lab Units 02/01/25  0812 01/31/25  1032   WBC AUTO x10*3/uL 9.4 10.3   RBC AUTO x10*6/uL 3.66* 3.88*   HEMOGLOBIN g/dL 11.9* 12.8   HEMATOCRIT % 35.3* 36.7   MCV fL 96 95   MCH pg 32.5 33.0   MCHC g/dL 33.7 34.9   RDW % 13.2 13.2   PLATELETS AUTO x10*3/uL 175 221       Cardiac Enzymes:   Results from last 7 days   Lab Units 01/31/25  1118 01/31/25  1032   TROPHS ng/L 97* 90*         Hepatic Function Panel:  Results from last 7 days   Lab Units 01/31/25  1032   ALK PHOS U/L 70   ALT U/L 13   AST U/L 21   PROTEIN TOTAL g/dL 7.0   BILIRUBIN TOTAL mg/dL 0.6       Magnesium:  Results from last 7 days   Lab Units 01/31/25  1032   MAGNESIUM mg/dL 1.82       Pro-BNP:  No results found for: \"PROBNP\"    INR:  Results from last 7 days   Lab Units 01/31/25  1032   PROTIME seconds 12.1   INR  1.1       TSH:  No results found for: \"TSH\"    Lipid Profile:        No lab exists for component: \"LABVLDL\"    HgbA1C:        Lactate Level:  No lab exists for component: \"LACTA\"    CMP:  Results from last 7 days   Lab Units 02/01/25  0812 01/31/25  1032 "   SODIUM mmol/L 133* 131*   POTASSIUM mmol/L 4.0 4.7   CHLORIDE mmol/L 98 96*   CO2 mmol/L 27 24   BUN mg/dL 42* 28*   CREATININE mg/dL 1.36* 1.06*   GLUCOSE mg/dL 121* 102*   CALCIUM mg/dL 9.2 9.6   PROTEIN TOTAL g/dL  --  7.0   BILIRUBIN TOTAL mg/dL  --  0.6   ALK PHOS U/L  --  70   AST U/L  --  21   ALT U/L  --  13

## 2025-02-03 VITALS
RESPIRATION RATE: 16 BRPM | SYSTOLIC BLOOD PRESSURE: 130 MMHG | DIASTOLIC BLOOD PRESSURE: 63 MMHG | HEIGHT: 61 IN | BODY MASS INDEX: 26.43 KG/M2 | HEART RATE: 59 BPM | WEIGHT: 139.99 LBS | TEMPERATURE: 98.4 F | OXYGEN SATURATION: 95 %

## 2025-02-03 PROCEDURE — 2500000001 HC RX 250 WO HCPCS SELF ADMINISTERED DRUGS (ALT 637 FOR MEDICARE OP): Performed by: HOSPITALIST

## 2025-02-03 PROCEDURE — 2500000004 HC RX 250 GENERAL PHARMACY W/ HCPCS (ALT 636 FOR OP/ED): Performed by: HOSPITALIST

## 2025-02-03 PROCEDURE — 94640 AIRWAY INHALATION TREATMENT: CPT

## 2025-02-03 PROCEDURE — 2500000002 HC RX 250 W HCPCS SELF ADMINISTERED DRUGS (ALT 637 FOR MEDICARE OP, ALT 636 FOR OP/ED): Performed by: HOSPITALIST

## 2025-02-03 PROCEDURE — 2500000002 HC RX 250 W HCPCS SELF ADMINISTERED DRUGS (ALT 637 FOR MEDICARE OP, ALT 636 FOR OP/ED): Performed by: STUDENT IN AN ORGANIZED HEALTH CARE EDUCATION/TRAINING PROGRAM

## 2025-02-03 PROCEDURE — 99239 HOSP IP/OBS DSCHRG MGMT >30: CPT | Performed by: HOSPITALIST

## 2025-02-03 PROCEDURE — 97162 PT EVAL MOD COMPLEX 30 MIN: CPT | Mod: GP

## 2025-02-03 PROCEDURE — 97165 OT EVAL LOW COMPLEX 30 MIN: CPT | Mod: GO

## 2025-02-03 RX ADMIN — GABAPENTIN 300 MG: 300 CAPSULE ORAL at 14:25

## 2025-02-03 RX ADMIN — BUPROPION HYDROCHLORIDE 150 MG: 150 TABLET, EXTENDED RELEASE ORAL at 05:55

## 2025-02-03 RX ADMIN — ATORVASTATIN CALCIUM 80 MG: 80 TABLET, FILM COATED ORAL at 08:17

## 2025-02-03 RX ADMIN — PREDNISONE 20 MG: 20 TABLET ORAL at 08:18

## 2025-02-03 RX ADMIN — AZITHROMYCIN DIHYDRATE 500 MG: 250 TABLET, FILM COATED ORAL at 08:17

## 2025-02-03 RX ADMIN — IPRATROPIUM BROMIDE AND ALBUTEROL SULFATE 3 ML: .5; 3 SOLUTION RESPIRATORY (INHALATION) at 12:39

## 2025-02-03 RX ADMIN — DILTIAZEM HYDROCHLORIDE 300 MG: 300 CAPSULE, COATED, EXTENDED RELEASE ORAL at 08:19

## 2025-02-03 RX ADMIN — CEFTRIAXONE SODIUM 1 G: 1 INJECTION, SOLUTION INTRAVENOUS at 14:25

## 2025-02-03 RX ADMIN — ASPIRIN 81 MG: 81 TABLET, CHEWABLE ORAL at 08:17

## 2025-02-03 RX ADMIN — Medication 1 TABLET: at 08:17

## 2025-02-03 RX ADMIN — GABAPENTIN 300 MG: 300 CAPSULE ORAL at 08:18

## 2025-02-03 RX ADMIN — HYDRALAZINE HYDROCHLORIDE 25 MG: 25 TABLET ORAL at 05:19

## 2025-02-03 RX ADMIN — IPRATROPIUM BROMIDE AND ALBUTEROL SULFATE 3 ML: .5; 3 SOLUTION RESPIRATORY (INHALATION) at 07:11

## 2025-02-03 RX ADMIN — FOLIC ACID 0.5 MG: 1 TABLET ORAL at 08:17

## 2025-02-03 RX ADMIN — DOXAZOSIN 2 MG: 1 TABLET ORAL at 08:17

## 2025-02-03 ASSESSMENT — ACTIVITIES OF DAILY LIVING (ADL): BATHING_ASSISTANCE: MODERATE

## 2025-02-03 ASSESSMENT — PAIN - FUNCTIONAL ASSESSMENT
PAIN_FUNCTIONAL_ASSESSMENT: 0-10

## 2025-02-03 ASSESSMENT — COGNITIVE AND FUNCTIONAL STATUS - GENERAL
DRESSING REGULAR UPPER BODY CLOTHING: A LITTLE
MOBILITY SCORE: 19
MOVING TO AND FROM BED TO CHAIR: A LITTLE
CLIMB 3 TO 5 STEPS WITH RAILING: A LOT
TURNING FROM BACK TO SIDE WHILE IN FLAT BAD: A LITTLE
WALKING IN HOSPITAL ROOM: A LITTLE
DRESSING REGULAR LOWER BODY CLOTHING: A LITTLE
EATING MEALS: A LITTLE
HELP NEEDED FOR BATHING: A LITTLE
TOILETING: A LITTLE
TOILETING: A LOT
MOVING TO AND FROM BED TO CHAIR: A LOT
STANDING UP FROM CHAIR USING ARMS: A LITTLE
MOBILITY SCORE: 13
DRESSING REGULAR LOWER BODY CLOTHING: A LOT
PERSONAL GROOMING: A LITTLE
DRESSING REGULAR UPPER BODY CLOTHING: A LITTLE
DAILY ACTIVITIY SCORE: 15
DAILY ACTIVITIY SCORE: 20
WALKING IN HOSPITAL ROOM: A LOT
STANDING UP FROM CHAIR USING ARMS: A LOT
HELP NEEDED FOR BATHING: A LOT
MOVING FROM LYING ON BACK TO SITTING ON SIDE OF FLAT BED WITH BEDRAILS: A LITTLE
CLIMB 3 TO 5 STEPS WITH RAILING: TOTAL

## 2025-02-03 ASSESSMENT — PAIN SCALES - GENERAL
PAINLEVEL_OUTOF10: 0 - NO PAIN

## 2025-02-03 NOTE — PROGRESS NOTES
Occupational Therapy    Evaluation    Patient Name: Therese Child  MRN: [id number]  Department: [facility]  Room: Counts include 234 beds at the [facility]  Today's Date: 2/3/2025  Time Calculation  Start Time: 1035  Stop Time: 1100  Time Calculation (min): 25 min    Assessment  IP OT Assessment  End of Session Communication: Bedside nurse  End of Session Patient Position: Up in chair, Alarm on (LEs elevated in recliner, all needs in reach)  Plan:  Treatment Interventions: ADL retraining, Functional transfer training, UE strengthening/ROM, Endurance training, Cognitive reorientation, Patient/family training, Equipment evaluation/education, Neuromuscular reeducation, Fine motor coordination activities, Compensatory technique education  OT Frequency: 3 times per week  OT Discharge Recommendations: Moderate intensity level of continued care, High intensity level of continued care  OT - OK to Discharge: Yes (when medically appropriate)    Subjective   Current Problem:  1. Pneumonia of left lung due to infectious organism, unspecified part of lung  azithromycin (Zithromax) 500 mg tablet    cefuroxime (Ceftin) 250 mg tablet      2. RSV infection  predniSONE (Deltasone) 20 mg tablet    guaiFENesin (Mucinex) 600 mg 12 hr tablet      3. Shortness of breath        4. Essential hypertension        5. BAKARI (acute kidney injury) (CMS-[facility])  Basic metabolic panel        General:  General  Reason for Referral: ADL  Referred By: Frances  Past Medical History Relevant to Rehab: Includes: HTN, appendectomy, brain tumor, gamma knife surgery, hip surgery, knee surgery, spine surgery, shoulder surgery, wrist surgery, pt.reports hydrocephalus and no shunt.  Family/Caregiver Present: No  Co-Treatment: PT  Co-Treatment Reason: safety  Prior to Session Communication: Bedside nurse  Patient Position Received: Bed, 3 rail up, Up in chair  General Comment: Pt. admitted with generalized weakness, HTN, community acquired PNA, bronchitis, acute hypoxic respiratory failure,  + RSV.  Pt. reports d/c from [facility] ED day before admission. Chest CT (1/31): negative PE. Possible PNA left upper lung.  Precautions:  Hearing/Visual Limitations: wears glasses  Medical Precautions: Fall precautions (retropulsive with sitting, standing and gait.)  Precautions Comment: contact/droplet precautions for RSV      Vital Signs Comment: 93% spO2 on room air.    Pain:  Pain Assessment  Pain Assessment: 0-10  0-10 (Numeric) Pain Score: 0 - No pain    Objective   Cognition:  Overall Cognitive Status: Within Functional Limits  Orientation Level: Oriented X4           Home Living:  Home Living Comments: Pt. lives with her .  1st floor set up.  Pt. ambulates with a rollator.  Independent with ADLs.  IADLs are shared between patient and .  More than 5 falls in last 6 months. Calls son and EMS for lift assistance after falls.   Prior Function:     IADL History:     ADL:  Eating Deficit: Setup  Grooming Deficit: Setup  Bathing Assistance: Moderate  UE Dressing Assistance: Minimal  LE Dressing Assistance: Moderate  Toileting Assistance with Device: Maximal  ADL Comments: mild fine motor deficits and significant balance deficits with retropulsion  Activity Tolerance:     Bed Mobility/Transfers: Bed Mobility  Bed Mobility: Yes (Min assist sup to sit. Assist for balance while elevating trunk and scooting to EOB.)    Transfers  Transfer: Yes (Mod assist sit<>stand at EOB with gait belt and FWW.  Mod assist bed to chair with gait belt and FWW stand/step transfer. Assist with forward weight shift, lift, balance, descent.)      Functional Mobility:  Functional Mobility  Functional Mobility Performed: Yes  Functional Mobility 1  Comments 1: Pt. ambulated ~ 15' with FWW and gait belt with mod to max assist. Small shuffling steps, max cues for gait sequence with FWW, retropulsive with posterior losses of balance, impaired motor planning and coordination. Pt. reports leg length discrepancy with L shorter than  "R.  Sitting Balance:  Static Sitting Balance  Static Sitting-Comment/Number of Minutes: poor  Dynamic Sitting Balance  Dynamic Sitting-Comments: poor  Standing Balance:  Static Standing Balance  Static Standing-Comment/Number of Minutes: poor  Dynamic Standing Balance  Dynamic Standing-Comments: poor    Sensation:  Sensation Comment: reports chronic intermittent LLE numbness  Strength:  Strength Comments: UEs WFL  Perception:  Motor Planning:  (impaired UEs, LEs and trunk)  Coordination:  Coordination Comment: Pt. reports difficulty with some of her fine motor tasks, especially when tasks are out of her sight, i.e. setting her hair in curlers. \"My hair is my thing, but my fingers won't work to do that anymore.\"   Hand Function:     Extremities: RUE   RUE :  (RUE AROM WFL) and KYLIEE   KYLIEE:  (LUE AROM WFL)    Outcome Measures: [facility] Daily Activity  Putting on and taking off regular lower body clothing: A lot  Bathing (including washing, rinsing, drying): A lot  Putting on and taking off regular upper body clothing: A little  Toileting, which includes using toilet, bedpan or urinal: A lot  Taking care of personal grooming such as brushing teeth: A little  Eating Meals: A little  Daily Activity - Total Score: 15      Education Documentation  Body Mechanics, taught by Cyndi Wade OT at 2/3/2025  2:30 PM.  Learner: Patient  Readiness: Acceptance  Method: Explanation  Response: Needs Reinforcement    Precautions, taught by Cyndi Wade OT at 2/3/2025  2:30 PM.  Learner: Patient  Readiness: Acceptance  Method: Explanation  Response: Needs Reinforcement    ADL Training, taught by Cyndi Wade OT at 2/3/2025  2:30 PM.  Learner: Patient  Readiness: Acceptance  Method: Explanation  Response: Needs Reinforcement    Education Comments  No comments found.      Goals:   Encounter Problems       Encounter Problems (Active)       OT Goals       CGA sit/stand, bed/chair/commode transfers with FWW        " Start:  02/03/25    Expected End:  02/17/25            Fair dynamic sitting balance for ADL.        Start:  02/03/25    Expected End:  02/17/25            Fair (-) dynamic standing balance for ADL.        Start:  02/03/25    Expected End:  02/17/25            Bilat. UE FMC WFL for ADLs.        Start:  02/03/25    Expected End:  02/17/25            Min assist ADL functional mobility with FWW.       Start:  02/03/25    Expected End:  02/17/25

## 2025-02-03 NOTE — NURSING NOTE
Report called to FARHAN López NR. Belongings packed.     1705: [facility] ambulance here to take patient to [facility].

## 2025-02-03 NOTE — PROGRESS NOTES
Physical Therapy    Physical Therapy Evaluation    Patient Name: Therese Child  MRN: [id number]  Today's Date: 2/3/2025   Time Calculation  Start Time: 1035  Stop Time: 1100  Time Calculation (min): 25 min  1019/1019-A    Assessment/Plan   PT Assessment  PT Assessment Results: Decreased strength, Impaired balance, Decreased mobility, Decreased endurance, Decreased safety awareness  Rehab Prognosis: Fair  Barriers to Discharge Home: Physical needs  End of Session Communication: Bedside nurse (ANM)  Assessment Comment: Pt requires mod A for ambulation with multiple LOB. Poor safety with ambulation and pt is high fall risk. Recommend continued PT at moderate intensity and 24 hour care and assist.  End of Session Patient Position: Up in chair, Alarm on  IP OR SWING BED PT PLAN  Inpatient or Swing Bed: Inpatient  PT Plan  Treatment/Interventions: Bed mobility, Transfer training, Gait training, Balance training, Strengthening  PT Plan: Ongoing PT  PT Frequency: 3 times per week  PT Discharge Recommendations: Moderate intensity level of continued care  PT Recommended Transfer Status: Assist x1 (gait belt and WW, mod/max A)      Subjective     Current Problem:  PNA    General Visit Information:  General  Reason for Referral: impaired mobility  Referred By: Frances (PT/OT 1/31)  Past Medical History Relevant to Rehab: Including HTN, appy, brain tumor, gamma knife sx, R THR, knee sx, spine sx, shoulder sx  Family/Caregiver Present: No  Co-Treatment: OT  Co-Treatment Reason: to maximize pt safety and mobility  Prior to Session Communication: Bedside nurse (asst nurse manager - pt upset due to thrapy not being there sooner.)  Patient Position Received: Bed, 3 rail up, Alarm on  General Comment: Pt admitted with generalized weakness, HTN, bronchitis. + RSV. pt reports d/c from [facility] ED day before admission. Chest CT (1/31) - negative PE. Possible PNA left upper lung. admit dx: PNA    Home Living:  Home Living  Home Living  Comments: Pt lives with  in one level home. owns lift chair and rollator. Difficulty obtaining full home set up due to pt talking and hard to redirect    Prior Level of Function:  Prior Function Per Pt/Caregiver Report  Prior Function Comments: Pt ambulates with Rollator. Assist with ADLs and IADLs from  and/or son. Reports >5 falls in last 6 months. unable to get up on her own, calls son or fire dept    Precautions:  Precautions  Hearing/Visual Limitations: wears glasses  Medical Precautions: Fall precautions  Precautions Comment: severe retrolean with multiple LOB, recommend gait belt for safety.    Vital Signs:  Vital Signs  SpO2: 93 % (throughout session.)    Objective     Pain:  Pain Assessment  Pain Assessment: 0-10  0-10 (Numeric) Pain Score: 0 - No pain    Cognition:  Cognition  Orientation Level: Oriented X4  Safety/Judgement: Exceptions to WFL (impaired, unaware of deficits)  Insight: Mild    General Assessments:  General Observation  General Observation: Pt agreeable to PT/OT evaluation. Pt requires redirectin to task, upset that therapy did not see over weekend, will followed up with assistant nurse manager.   Activity Tolerance  Endurance: Tolerates 10 - 20 min exercise with multiple rests  Sensation  Sensation Comment: reports intermittent L LE numbness, not new, denies numbness/tingling in B/L UE and R LE  Strength  Strength Comments: B/L UE WFL for PT, fully assessed by OT, B/L hip flex, knee ext and DF 4-/5 MMT        Postural Control  Posture Comment: forward head and shoulders  Static Sitting Balance  Static Sitting-Comment/Number of Minutes: Good -  Dynamic Sitting Balance  Dynamic Sitting-Comments: Fair +  Static Standing Balance  Static Standing-Comment/Number of Minutes: Fair -  Dynamic Standing Balance  Dynamic Standing-Comments: Poor    Functional Assessments:     Bed Mobility  Bed Mobility: Yes  Bed Mobility 1  Bed Mobility Comments 1: pt performed sup to sit with HOB elevated  with min A. increased cues for initiation and safety.  Transfers  Transfer: Yes  Transfer 1  Trials/Comments 1: Pt performed sit to stand x2 during session, cues for hand placement, Mod A with WW and gait belt, limited by increased retrolean and LOB, cues for use of WW.  Ambulation/Gait Training  Ambulation/Gait Training Performed: Yes  Ambulation/Gait Training 1  Comments/Distance (ft) 1: Pt ambulated bed to chair and 10 feet x1 with WW and gait belt and mod A. pt with poor balance, shuffling gait with poor foot cleaance. L LE guarded and unequal step length. One large LOB with max A x1 to recover due to retrolean and poor proprioception.          Extremity/Trunk Assessments:  RUE   RUE : Within Functional Limits (ROM)  LUE   LUE: Within Functional Limits (ROM)  RLE   RLE : Within Functional Limits (ROM)  LLE   LLE : Within Functional Limits (ROM, reports concern with leg length discrepency, L shorter than R leg.)    Outcome Measures:     [facility] Basic Mobility  Turning from your back to your side while in a flat bed without using bedrails: A little  Moving from lying on your back to sitting on the side of a flat bed without using bedrails: A little  Moving to and from bed to chair (including a wheelchair): A lot  Standing up from a chair using your arms (e.g. wheelchair or bedside chair): A lot  To walk in hospital room: A lot  Climbing 3-5 steps with railing: Total  Basic Mobility - Total Score: 13            Goals:         Pt will demonstrate SBA with all bed mobility   (Progressing)       Start:  02/03/25    Expected End:  02/17/25            Pt will demonstrate sit to stand and bed/chair transfers with ww with CGA.   (Progressing)       Start:  02/03/25    Expected End:  02/17/25            Pt will ambulate 30  feet x2 with WW  with no LOB  (Progressing)       Start:  02/03/25    Expected End:  02/17/25            Pt will tolerate 15 reps x2 LE therapeutic exercise seated/standing for LE strengthening, balance  and endurance  (Progressing)       Start:  02/03/25    Expected End:  02/17/25                 Education Documentation  Mobility Training, taught by Kaitlin Barcenas, PT at 2/3/2025 12:17 PM.  Learner: Patient  Readiness: Acceptance  Method: Explanation  Response: Verbalizes Understanding  Comment: educated on PT POC and safety with mobility.

## 2025-02-03 NOTE — CARE PLAN
The patient's goals for the shift include Labs WNL    The clinical goals for the shift include Labs WNL      Problem: Pain - Adult  Goal: Verbalizes/displays adequate comfort level or baseline comfort level  Outcome: Progressing     Problem: Discharge Planning  Goal: Discharge to home or other facility with appropriate resources  Outcome: Progressing     Problem: Chronic Conditions and Co-morbidities  Goal: Patient's chronic conditions and co-morbidity symptoms are monitored and maintained or improved  Outcome: Progressing     Problem: Nutrition  Goal: Nutrient intake appropriate for maintaining nutritional needs  Outcome: Progressing     Problem: Skin  Goal: Participates in plan/prevention/treatment measures  2/3/2025 0540 by Radha Rojas RN  Outcome: Progressing  2/2/2025 2109 by Radha Rojas RN  Flowsheets (Taken 2/2/2025 2109)  Participates in plan/prevention/treatment measures:   Increase activity/out of bed for meals   Discuss with provider PT/OT consult   Elevate heels  Goal: Prevent/manage excess moisture  2/3/2025 0540 by Radha Rojas RN  Outcome: Progressing  2/2/2025 2109 by Radha Rojas RN  Flowsheets (Taken 2/2/2025 2109)  Prevent/manage excess moisture:   Use wicking fabric (obtain order)   Moisturize dry skin   Cleanse incontinence/protect with barrier cream   Monitor for/manage infection if present   Follow provider orders for dressing changes  Goal: Prevent/minimize sheer/friction injuries  2/3/2025 0540 by Radha Rojas RN  Outcome: Progressing  2/2/2025 2109 by Radha Rojas RN  Flowsheets (Taken 2/2/2025 2109)  Prevent/minimize sheer/friction injuries:   Utilize specialty bed per algorithm   Use pull sheet   Turn/reposition every 2 hours/use positioning/transfer devices   Increase activity/out of bed for meals   HOB 30 degrees or less   Complete micro-shifts as needed if patient unable. Adjust patient position to relieve pressure points, not a full turn  Goal:  Promote/optimize nutrition  2/3/2025 0540 by Radha Rojas RN  Outcome: Progressing  2/2/2025 2109 by Radha Rojas RN  Flowsheets (Taken 2/2/2025 2109)  Promote/optimize nutrition:   Offer water/supplements/favorite foods   Discuss with provider if NPO > 2 days   Assist with feeding   Reassess MST if dietician not consulted   Monitor/record intake including meals   Consume > 50% meals/supplements  Goal: Promote skin healing  2/3/2025 0540 by Radha Rojas RN  Outcome: Progressing  2/2/2025 2109 by Radha Roajs RN  Flowsheets (Taken 2/2/2025 2109)  Promote skin healing:   Turn/reposition every 2 hours/use positioning/transfer devices   Rotate device position/do not position patient on device   Protective dressings over bony prominences   Ensure correct size (line/device) and apply per  instructions   Assess skin/pad under line(s)/device(s)     Problem: Fall/Injury  Goal: Not fall by end of shift  Outcome: Progressing  Goal: Be free from injury by end of the shift  Outcome: Progressing  Goal: Verbalize understanding of personal risk factors for fall in the hospital  Outcome: Progressing  Goal: Verbalize understanding of risk factor reduction measures to prevent injury from fall in the home  Outcome: Progressing  Goal: Use assistive devices by end of the shift  Outcome: Progressing  Goal: Pace activities to prevent fatigue by end of the shift  Outcome: Progressing     Problem: Respiratory  Goal: Clear secretions with interventions this shift  Outcome: Progressing  Goal: Minimal/no exertional discomfort or dyspnea this shift  Outcome: Progressing  Goal: No signs of respiratory distress (eg. Use of accessory muscles. Peds grunting)  Outcome: Progressing  Goal: Patent airway maintained this shift  Outcome: Progressing  Goal: Verbalize decreased shortness of breath this shift  Outcome: Progressing  Goal: Wean oxygen to maintain O2 saturation per order/standard this shift  Outcome:  Progressing     Problem: Isolation  Goal: No cross contamination to visitors, employees, or other residents  Outcome: Progressing     Problem: Pain  Goal: Takes deep breaths with improved pain control throughout the shift  Outcome: Progressing  Goal: Turns in bed with improved pain control throughout the shift  Outcome: Progressing  Goal: Walks with improved pain control throughout the shift  Outcome: Progressing  Goal: Performs ADL's with improved pain control throughout shift  Outcome: Progressing  Goal: Participates in PT with improved pain control throughout the shift  Outcome: Progressing  Goal: Free from opioid side effects throughout the shift  Outcome: Progressing  Goal: Free from acute confusion related to pain meds throughout the shift  Outcome: Progressing

## 2025-02-03 NOTE — PROGRESS NOTES
Per rowena lanier, pt. Requesting to go to [facility] snf, currently awaiting pt/ot eric.  Referral sent to [facility].  Pt. With medicare ins., will  not require ins. Precert.    Update:  erin able to accept pt.  She will discharge this date to [facility] at 2:30 pm via ambulance.  Pt. Aware and in agreement to transfer.

## 2025-02-04 LAB
BACTERIA BLD CULT: NORMAL
BACTERIA BLD CULT: NORMAL

## 2025-02-05 ENCOUNTER — NURSING HOME VISIT (OUTPATIENT)
Dept: POST ACUTE CARE | Facility: EXTERNAL LOCATION | Age: 85
End: 2025-02-05
Payer: MEDICARE

## 2025-02-05 DIAGNOSIS — I10 ESSENTIAL HYPERTENSION: ICD-10-CM

## 2025-02-05 DIAGNOSIS — B33.8 RSV (RESPIRATORY SYNCYTIAL VIRUS INFECTION): ICD-10-CM

## 2025-02-05 DIAGNOSIS — E78.2 MIXED HYPERLIPIDEMIA: ICD-10-CM

## 2025-02-05 DIAGNOSIS — J18.9 PNEUMONIA OF LEFT LUNG DUE TO INFECTIOUS ORGANISM, UNSPECIFIED PART OF LUNG: ICD-10-CM

## 2025-02-05 DIAGNOSIS — J96.01 ACUTE RESPIRATORY FAILURE WITH HYPOXIA (MULTI): Primary | ICD-10-CM

## 2025-02-05 NOTE — LETTER
Patient: Therese Child  : 1940    Encounter Date: 2025    Subjective  Patient ID: Therese Child is a 84 y.o. female who is acute skilled care and presents for initial visit for skilled nursing.    84-year-old female patient has been admitted after 3 days of hospitalization for RSV infection and also patient was having pulmonary consolidation.  Prior to this patient was visiting other hospitals emergency room RSV infection was diagnosed her condition did not improve, she was hypoxic, she has a impaired renal functions transiently.  Patient is a elderly female patient usually in a good state of health, she does not have any advanced lung disease.  She has history of hypertension and perhaps must have a chronic depressive disorder.  She has a fluctuating renal functions.  As I see this patient she was comfortable, she has been weaned off from nasal oxygen, her condition is improving, RSV usually cause secondary bacterial consolidation or bronchitis or bronchiolitis.  There are a lot of incidence of RSV infection in elderly patient.  Because of the profound nature of this viral infection patient was significantly debilitated and fatigued, on top of that has bacterial superinfection we presume.  Patient requires skilled nursing and rehabilitation and hence admitted here at the facility.  Patient was calm and comfortable, patient was able to communicate and answer all the questions.  Patient has been called before that she has a normal pressure hydrocephalus she has not opted for BP shunting.         Review of Systems   Constitutional:  Positive for activity change and fatigue.   HENT:  Positive for congestion.    Respiratory:  Positive for cough. Negative for apnea and shortness of breath.    Cardiovascular:  Negative for chest pain and leg swelling.   Gastrointestinal:  Negative for abdominal pain and nausea.   Musculoskeletal:  Positive for arthralgias.   Neurological:  Negative for dizziness and  weakness.   Psychiatric/Behavioral:  Negative for agitation, behavioral problems and confusion.        Objective  /68   Pulse 56   Wt 64.9 kg (143 lb)   BMI 27.02 kg/m²     Physical Exam  Constitutional:       Appearance: Normal appearance. She is overweight.   HENT:      Head: Normocephalic.   Eyes:      Conjunctiva/sclera: Conjunctivae normal.   Cardiovascular:      Rate and Rhythm: Normal rate and regular rhythm.      Pulses: Normal pulses.      Heart sounds: Normal heart sounds.   Pulmonary:      Effort: Pulmonary effort is normal.      Breath sounds: Rales present.   Abdominal:      Palpations: Abdomen is soft.   Musculoskeletal:         General: Deformity present. No tenderness.      Cervical back: Neck supple.   Skin:     General: Skin is warm and dry.   Neurological:      Mental Status: She is oriented to person, place, and time. Mental status is at baseline.   Psychiatric:         Mood and Affect: Mood normal.         Behavior: Behavior normal.         Assessment/Plan  Problem List Items Addressed This Visit             ICD-10-CM    Essential hypertension I10    Mixed hyperlipidemia E78.2    Acute respiratory failure with hypoxia (Multi) - Primary J96.01    Pneumonia of left lung due to infectious organism, unspecified part of lung J18.9    RSV (respiratory syncytial virus infection) B33.8   As far as patient's clinical condition is concerned she is improving, respiratory failure has been resolving, medications include albuterol, aspirin, atorvastatin, azithromycin for 3 days, cefuroxime for 3 days, bupropion, diltiazem 300 mg, cholecalciferol, doxazosin 2 mg, gabapentin 900 mg, hydralazine, losartan/HCTZ, prednisone for 4 days and vitamin C.  Predominantly antibiotics steroids has been given, she is comfortable, RSV infection is going to subside, must have a brittle hypertension and hence patient is on 3 antihypertensives, patient remains on high-dose of statin.  Physical therapy, Occupational  Therapy evaluation are in process, other routine safety precautions has to be taken as per the facility protocol, short-term skilled nursing and rehabilitation is warranted, laboratories will be done as per our routine, all other standard standing orders will be followed, any change in the condition will be notified to us, discussed with nursing staff, NPH is not the issue, she does not have any significant falls or urinary incontinence.  There is no much pulmonary finding, periodic assessment and follow-up should be sufficient.     Goals    None           Electronically Signed By: Cale Paez MD   2/6/25  7:49 PM

## 2025-02-06 VITALS
BODY MASS INDEX: 27.02 KG/M2 | HEART RATE: 56 BPM | SYSTOLIC BLOOD PRESSURE: 126 MMHG | WEIGHT: 143 LBS | DIASTOLIC BLOOD PRESSURE: 68 MMHG

## 2025-02-06 PROBLEM — B33.8 RSV (RESPIRATORY SYNCYTIAL VIRUS INFECTION): Status: ACTIVE | Noted: 2025-02-06

## 2025-02-06 ASSESSMENT — ENCOUNTER SYMPTOMS
ARTHRALGIAS: 1
NAUSEA: 0
SHORTNESS OF BREATH: 0
COUGH: 1
DIZZINESS: 0
AGITATION: 0
ACTIVITY CHANGE: 1
FATIGUE: 1
APNEA: 0
ABDOMINAL PAIN: 0
CONFUSION: 0
WEAKNESS: 0

## 2025-02-09 DIAGNOSIS — N17.9 AKI (ACUTE KIDNEY INJURY) (CMS-HCC): ICD-10-CM

## 2025-02-11 ENCOUNTER — NURSING HOME VISIT (OUTPATIENT)
Dept: POST ACUTE CARE | Facility: EXTERNAL LOCATION | Age: 85
End: 2025-02-11
Payer: MEDICARE

## 2025-02-11 VITALS
HEART RATE: 62 BPM | RESPIRATION RATE: 18 BRPM | HEIGHT: 61 IN | BODY MASS INDEX: 26.81 KG/M2 | TEMPERATURE: 98.2 F | OXYGEN SATURATION: 93 % | WEIGHT: 142 LBS | SYSTOLIC BLOOD PRESSURE: 136 MMHG | DIASTOLIC BLOOD PRESSURE: 58 MMHG

## 2025-02-11 DIAGNOSIS — E78.2 MIXED HYPERLIPIDEMIA: ICD-10-CM

## 2025-02-11 DIAGNOSIS — F32.A DEPRESSION, UNSPECIFIED DEPRESSION TYPE: ICD-10-CM

## 2025-02-11 DIAGNOSIS — Z74.09 IMPAIRED FUNCTIONAL MOBILITY, BALANCE, GAIT, AND ENDURANCE: ICD-10-CM

## 2025-02-11 DIAGNOSIS — B33.8 RSV (RESPIRATORY SYNCYTIAL VIRUS INFECTION): Primary | ICD-10-CM

## 2025-02-11 DIAGNOSIS — I10 ESSENTIAL HYPERTENSION: ICD-10-CM

## 2025-02-11 PROCEDURE — 99310 SBSQ NF CARE HIGH MDM 45: CPT | Performed by: PHYSICIAN ASSISTANT

## 2025-02-11 NOTE — LETTER
"Patient: Therese Child  : 1940    Encounter Date: 2025  Name: Therese Child  YOB: 1940    Chief complaint: RSV bronchitis. Pneumonia.    HPI: This is a 84 year old  female who has a medical history remarkable for spinal stenosis, hypertension, pulmonary hypertension, glaucoma, hyperlipidemia, gerd, anemia, and macular degeneration of R eye. She presented to the ER with worsening shortness of breath , cough, and congestion. CT angio scan showed an opacity in the left upper lobe corresponding to the density seen on the chest x-ray. This has the appearance of consolidation and pneumonia rather than mass. Negative for PE. She received Ceftriaxone and Azithromycin. RSV was positive. She was started on prednisone. Patient was discharged to SNF for rehab.     HPI    Review of systems:   ROS negative except were noted in HPI.    Code Status: DNR-CC    /58   Pulse 62   Temp 36.8 °C (98.2 °F)   Resp 18   Ht 1.549 m (5' 1\")   Wt 64.4 kg (142 lb)   SpO2 93%   BMI 26.83 kg/m²      Physical Exam  Constitutional:       General: She is not in acute distress.  HENT:      Head: Normocephalic.      Nose: Nose normal.      Mouth/Throat:      Mouth: Mucous membranes are moist.   Eyes:      Extraocular Movements: Extraocular movements intact.      Pupils: Pupils are equal, round, and reactive to light.   Cardiovascular:      Rate and Rhythm: Normal rate and regular rhythm.      Pulses: Normal pulses.   Pulmonary:      Effort: Pulmonary effort is normal.      Breath sounds: Normal breath sounds. No wheezing or rales.   Abdominal:      General: Bowel sounds are normal. There is no distension.      Palpations: Abdomen is soft.      Tenderness: There is no abdominal tenderness. There is no guarding.   Genitourinary:     Comments: Voiding.  Musculoskeletal:      Cervical back: Normal range of motion.      Comments: R leg strength >L, not new.   Lymphadenopathy:      " Cervical: No cervical adenopathy.   Neurological:      Mental Status: She is alert.        Medications reviewed during visit at facility.  Medrol dose pack 4 mg   Vitamin D 3 50 meq po daily  Mucinex 600 mg po bid  Albuterol 108 two puffs q 4 hours prn  Vitamin C 500 mg po bid  Tylenol 650 mg po q 4 hours prn  MOM 30 ml po daily prn  Doxazosin 2 mg po daily  Atorvastatin 80 mg po daily  Aspirin 81 mg po daily  Bupropion  mg po daily  Hydralazine 50 mg po bid  Gabapentin 300 mg po tid  Losartan / hydrochlorothiazide 100/25 mg po daily  Methocarbamol 500 mg po bid  Cartia  mg po daily  Labs reviewed at facility:    Laboratory: 2025 09:30 CBC and Differential / Comp Metabolic Panel  Latest Version (more available)  Reviewed by xu on 2025 12:54  Resident Information  Resident: Dawn Child ([id number])  Admit Date: 2/3/2025  Admitting Provider:   Attending Provider:   Copy to List:   Report Information  Collection Date: 2025 06:46  Received Date: 2025 06:46  Reported Date: 2025 09:30  Ord. Provider: Cale Paez  Source Chicas: [id number]  Lab Information  Status: Completed  Flag:    Reporting Lab:   [facility]  Order #:   [id number]  Vendor Order #:   [id number]  Category:   Chemistry, Hematology, Unknown Category  Order Notes  Result for:  DAWN CHILD  ( 1940, F)      Results   Show All Details Result Units Ref. Range Flag Status   CBC and Differential       White Blood Cell Count  9.87 k/uL 3.70-11.00  Final           RBC  3.56 m/uL 3.90-5.20 L Final           Hemoglobin  11.2 g/dL 11.5-15.5 L Final           Hematocrit  34.7 % 36.0-46.0 L Final           MCV  97.5 fL 80.0-100.0  Final           MCH  31.5 pg 26.0-34.0  Final           MCHC  32.3 g/dL 30.5-36.0  Final           RDW-CV  13.5 % 11.5-15.0  Final           Platelet Count  240 k/uL 150-400  Final           MPV  12.0 fL 9.0-12.7  Final           Neut%  64.7 %   Final           Abs  Neut  6.38 k/uL 1.45-7.50  Final           Lymph%  23.3 %   Final           Abs Lymph  2.30 k/uL 1.00-4.00  Final           Mono%  9.1 %   Final           Abs Mono  0.90 k/uL <0.87 H Final           Eosin%  2.2 %   Final           Abs Eosin  0.22 k/uL <0.46  Final           Baso%  0.3 %   Final           Abs Baso  0.03 k/uL <0.11  Final           Immature Gran %%  0.4 %   Final           Abs Immature Gran  0.04 k/uL <0.10  Final           NRBC  0.0 /100 WBC   Final           Absolute nRBC  <0.01 k/uL <0.01  Final           Diff Type  Auto    Final      Scheduled: 2/7/2025 7:00 AM  Scheduled: 2/7/2025 7:00 AM   Comp Metabolic Panel       Protein, Total  5.6 g/dL 6.3-8.0 L Final           Albumin  3.3 g/dL 3.9-4.9 L Final           Calcium, Total  9.3 mg/dL 8.5-10.2  Final           Bilirubin, Total  0.3 mg/dL 0.2-1.3  Final           Alkaline Phosphatase  59 U/L   Final           AST  21 U/L 13-35  Final           ALT  26 U/L 7-38  Final           Glucose  95 mg/dL 74-99  Final   BUN  42 mg/dL 7-21 H Final           Creatinine  1.03 mg/dL 0.58-0.96 H Final           Sodium  139 mmol/L 136-144  Final           Potassium  4.7 mmol/L 3.7-5.1  Final           Chloride  103 mmol/L   Final           CO2  29 mmol/L 22-30  Final           Anion Gap  7 mmol/L 8-15 L Final           Estimated Glomerular Filtration Rate  54 mL/min/1.73 meters squared >=60 L Final  Assessment/Plan   Problem List Items Addressed This Visit       Essential hypertension     Review BP readings. Continue with Losartan / hydrochlorothiazide 100/25 mg po daily, Hydralazine 50 mg po bid, Doxazosin 2 mg po daily, and Cartia  mg po daily.         Mixed hyperlipidemia     Maintain statin.         RSV (respiratory syncytial virus infection) - Primary     Finish prednisone. Review labs. CMP  C BC with diff q Friday. Albuterol 108 two puffs q 4 hours prn         Impaired functional mobility, balance, gait, and endurance     PT and OT to  assess and treat.         Depression     Bupropion  mg po daily.            Time:  I spent 45 minutes or greater with the patient. Greater than 50% of this time was spent in counseling and or coordination of care. The time includes prep time of reviewing vital signs, report from direct nursing staff and or therapists, hospital documentation, reviewing labs, radiographs, diagnostic tests and or consultations, time directly spent with the patient interviewing, examining, and education regarding diagnosis, treatments, and medications, as well as documentation in the electronic medical record, and reviewing the plan of care and any new orders with the patient, nursing staff and other staff directly related to the patients care.      Zach Rashid PA-C       Electronically Signed By: Zach Rashid PA-C   2/11/25 10:59 PM

## 2025-02-12 NOTE — DOCUMENTATION CLARIFICATION NOTE
"    PATIENT:               DAWN CAREY  ACCT #:                  [id number]  MRN:                       [id number]  :                       1940  ADMIT DATE:       2025 10:16 AM  DISCH DATE:        2/3/2025 5:13 PM  RESPONDING PROVIDER #:        [id number]          PROVIDER RESPONSE TEXT:    Acute Myocardial Injury    CDI QUERY TEXT:    Clarification        Instruction:    Based on your assessment of the patient and the clinical information, please provide the requested documentation by clicking on the appropriate radio button and enter any additional information if prompted.    Question: Is there a diagnosis indicative of the patient elevated Troponins and symptoms    When answering this query, please exercise your independent professional judgment. The fact that a question is being asked, does not imply that any particular answer is desired or expected.    The patient's clinical indicators include:  Clinical Information: 84 yof with pneumonia, mild renal insufficiency with hx of CKD 3    Clinical Indicators:     ED Provider:  \"Workup today, EKG performed at 1026 with ventricular to 66, as read by me, shows normal sinus rhythm normal axis normal normal, remarkable ST and T wave patterns, no evidence of acute ischemia other acute findings...\"    DC Summary: \"She did have mild renal insufficiency, Hx of  CKD stage III\"    Treatment: ECG, serum troponin    Risk Factors: Pneumonia, CKD 3, mild renal insufficiency  Options provided:  -- Acute Myocardial Injury  -- Chronic Myocardial Injury  -- Other - I will add my own diagnosis  -- Refer to Clinical Documentation Reviewer    Query created by: Roslyn Sidhu on 2/10/2025 4:40 PM      Electronically signed by:  DALTON SEQUEIRA MD 2025 7:05 AM          "

## 2025-02-12 NOTE — ASSESSMENT & PLAN NOTE
Finish prednisone. Review labs. CMP  C BC with diff q Friday. Albuterol 108 two puffs q 4 hours prn

## 2025-02-12 NOTE — ASSESSMENT & PLAN NOTE
Review BP readings. Continue with Losartan / hydrochlorothiazide 100/25 mg po daily, Hydralazine 50 mg po bid, Doxazosin 2 mg po daily, and Cartia  mg po daily.

## 2025-02-12 NOTE — DOCUMENTATION CLARIFICATION NOTE
"    PATIENT:               DAWN CAREY  ACCT #:                  [id number]  MRN:                       [id number]  :                       1940  ADMIT DATE:       2025 10:16 AM  DISCH DATE:        2/3/2025 5:13 PM  RESPONDING PROVIDER #:        [id number]          PROVIDER RESPONSE TEXT:    Acute Kidney Injury    CDI QUERY TEXT:    Clarification        Instruction:    Based on your assessment of the patient and the clinical information, please provide the requested documentation by clicking on the appropriate radio button and enter any additional information if prompted.    Question: Please clarify a diagnosis for the patient renal status    When answering this query, please exercise your independent professional judgment. The fact that a question is being asked, does not imply that any particular answer is desired or expected.    The patient's clinical indicators include:  Clinical Information: 84 yof with pneumonia    Clinical Indicators:     and  Creatinine: 1.06/1.36    DC Summary: \"She did have mild renal insufficiency, during her hospitalization her diuretics were held.  She will have a repeat BMP in 1 week. Hx of  CKD stage III\"    Treatment: Holding of diuretic, monitoring of serum creatinine, BMP in 1 week    Risk Factors: Pneumonia, CKD 3  Options provided:  -- Acute Kidney Injury  -- Other - I will add my own diagnosis  -- Refer to Clinical Documentation Reviewer    Query created by: Roslyn Sidhu on 2/10/2025 4:23 PM      Electronically signed by:  DALTON SEQUEIRA MD 2025 7:05 AM          "

## 2025-02-12 NOTE — PROGRESS NOTES
"2/11/2025  Name: Therese Child  YOB: 1940    Chief complaint: RSV bronchitis. Pneumonia.    HPI: This is a 84 year old  female who has a medical history remarkable for spinal stenosis, hypertension, pulmonary hypertension, glaucoma, hyperlipidemia, gerd, anemia, and macular degeneration of R eye. She presented to the ER with worsening shortness of breath , cough, and congestion. CT angio scan showed an opacity in the left upper lobe corresponding to the density seen on the chest x-ray. This has the appearance of consolidation and pneumonia rather than mass. Negative for PE. She received Ceftriaxone and Azithromycin. RSV was positive. She was started on prednisone. Patient was discharged to SNF for rehab.     HPI    Review of systems:   ROS negative except were noted in HPI.    Code Status: DNR-CC    /58   Pulse 62   Temp 36.8 °C (98.2 °F)   Resp 18   Ht 1.549 m (5' 1\")   Wt 64.4 kg (142 lb)   SpO2 93%   BMI 26.83 kg/m²      Physical Exam  Constitutional:       General: She is not in acute distress.  HENT:      Head: Normocephalic.      Nose: Nose normal.      Mouth/Throat:      Mouth: Mucous membranes are moist.   Eyes:      Extraocular Movements: Extraocular movements intact.      Pupils: Pupils are equal, round, and reactive to light.   Cardiovascular:      Rate and Rhythm: Normal rate and regular rhythm.      Pulses: Normal pulses.   Pulmonary:      Effort: Pulmonary effort is normal.      Breath sounds: Normal breath sounds. No wheezing or rales.   Abdominal:      General: Bowel sounds are normal. There is no distension.      Palpations: Abdomen is soft.      Tenderness: There is no abdominal tenderness. There is no guarding.   Genitourinary:     Comments: Voiding.  Musculoskeletal:      Cervical back: Normal range of motion.      Comments: R leg strength >L, not new.   Lymphadenopathy:      Cervical: No cervical adenopathy.   Neurological:      Mental Status: She is " alert.        Medications reviewed during visit at facility.  Medrol dose pack 4 mg   Vitamin D 3 50 meq po daily  Mucinex 600 mg po bid  Albuterol 108 two puffs q 4 hours prn  Vitamin C 500 mg po bid  Tylenol 650 mg po q 4 hours prn  MOM 30 ml po daily prn  Doxazosin 2 mg po daily  Atorvastatin 80 mg po daily  Aspirin 81 mg po daily  Bupropion  mg po daily  Hydralazine 50 mg po bid  Gabapentin 300 mg po tid  Losartan / hydrochlorothiazide 100/25 mg po daily  Methocarbamol 500 mg po bid  Cartia  mg po daily  Labs reviewed at facility:    Laboratory: 2025 09:30 CBC and Differential / Comp Metabolic Panel  Latest Version (more available)  Reviewed by xu on 2025 12:54  Resident Information  Resident: Dawn Child ([id number])  Admit Date: 2/3/2025  Admitting Provider:   Attending Provider:   Copy to List:   Report Information  Collection Date: 2025 06:46  Received Date: 2025 06:46  Reported Date: 2025 09:30  Ord. Provider: Cale Paez  Source [place]icas: [id number]  Lab Information  Status: Completed  Flag:    Reporting Lab:   [facility]  Order #:   [id number]  Vendor Order #:   [id number]  Category:   Chemistry, Hematology, Unknown Category  Order Notes  Result for:  DAWN CHILD  ( 1940, F)      Results   Show All Details Result Units Ref. Range Flag Status   CBC and Differential       White Blood Cell Count  9.87 k/uL 3.70-11.00  Final           RBC  3.56 m/uL 3.90-5.20 L Final           Hemoglobin  11.2 g/dL 11.5-15.5 L Final           Hematocrit  34.7 % 36.0-46.0 L Final           MCV  97.5 fL 80.0-100.0  Final           MCH  31.5 pg 26.0-34.0  Final           MCHC  32.3 g/dL 30.5-36.0  Final           RDW-CV  13.5 % 11.5-15.0  Final           Platelet Count  240 k/uL 150-400  Final           MPV  12.0 fL 9.0-12.7  Final           Neut%  64.7 %   Final           Abs Neut  6.38 k/uL 1.45-7.50  Final           Lymph%  23.3 %   Final           Abs  Lymph  2.30 k/uL 1.00-4.00  Final           Mono%  9.1 %   Final           Abs Mono  0.90 k/uL <0.87 H Final           Eosin%  2.2 %   Final           Abs Eosin  0.22 k/uL <0.46  Final           Baso%  0.3 %   Final           Abs Baso  0.03 k/uL <0.11  Final           Immature Gran %%  0.4 %   Final           Abs Immature Gran  0.04 k/uL <0.10  Final           NRBC  0.0 /100 WBC   Final           Absolute nRBC  <0.01 k/uL <0.01  Final           Diff Type  Auto    Final      Scheduled: 2/7/2025 7:00 AM  Scheduled: 2/7/2025 7:00 AM   Comp Metabolic Panel       Protein, Total  5.6 g/dL 6.3-8.0 L Final           Albumin  3.3 g/dL 3.9-4.9 L Final           Calcium, Total  9.3 mg/dL 8.5-10.2  Final           Bilirubin, Total  0.3 mg/dL 0.2-1.3  Final           Alkaline Phosphatase  59 U/L   Final           AST  21 U/L 13-35  Final           ALT  26 U/L 7-38  Final           Glucose  95 mg/dL 74-99  Final   BUN  42 mg/dL 7-21 H Final           Creatinine  1.03 mg/dL 0.58-0.96 H Final           Sodium  139 mmol/L 136-144  Final           Potassium  4.7 mmol/L 3.7-5.1  Final           Chloride  103 mmol/L   Final           CO2  29 mmol/L 22-30  Final           Anion Gap  7 mmol/L 8-15 L Final           Estimated Glomerular Filtration Rate  54 mL/min/1.73 meters squared >=60 L Final  Assessment/Plan    Problem List Items Addressed This Visit       Essential hypertension     Review BP readings. Continue with Losartan / hydrochlorothiazide 100/25 mg po daily, Hydralazine 50 mg po bid, Doxazosin 2 mg po daily, and Cartia  mg po daily.         Mixed hyperlipidemia     Maintain statin.         RSV (respiratory syncytial virus infection) - Primary     Finish prednisone. Review labs. CMP  C BC with diff q Friday. Albuterol 108 two puffs q 4 hours prn         Impaired functional mobility, balance, gait, and endurance     PT and OT to assess and treat.         Depression     Bupropion  mg po daily.             Time:  I spent 45 minutes or greater with the patient. Greater than 50% of this time was spent in counseling and or coordination of care. The time includes prep time of reviewing vital signs, report from direct nursing staff and or therapists, hospital documentation, reviewing labs, radiographs, diagnostic tests and or consultations, time directly spent with the patient interviewing, examining, and education regarding diagnosis, treatments, and medications, as well as documentation in the electronic medical record, and reviewing the plan of care and any new orders with the patient, nursing staff and other staff directly related to the patients care.      Zach Rashid PA-C

## 2025-02-12 NOTE — DOCUMENTATION CLARIFICATION NOTE
"    PATIENT:               DAWN CAREY  ACCT #:                  [id number]  MRN:                       [id number]  :                       1940  ADMIT DATE:       2025 10:16 AM  DISCH DATE:        2/3/2025 5:13 PM  RESPONDING PROVIDER #:        [id number]          PROVIDER RESPONSE TEXT:    Sepsis was a differential diagnosis and ruled out after study    CDI QUERY TEXT:    Clarification        Instruction:  Based on your assessment of the patient and the clinical information, please provide the requested documentation by clicking on the appropriate radio button and enter any additional information if prompted.    Question: Sepsis was documented in the medical record. Based on the documentation and the clinical information, can the diagnosis be further clarified as    When answering this query, please exercise your independent professional judgment. The fact that a question is being asked, does not imply that any particular answer is desired or expected.    The patient's clinical indicators include:  Clinical Information: 84 yof with pneumonia    Documented Diagnosis:  ED Provider: \"Concern for sepsis\"    Clinical Indicators:  -Vital Signs: RR: 15-18, Pulse: 53-68, Temp: 97.3-100.4, B/P: 112//60  -WBC: 10.3/9.4  -Microbiology Results:  Blood cultures with no growth  -Band Neutrophil Count/percent Band Neutrophil:  -Lactic acid: 0.9  -BUN/Creat: 28/1.06  -Bilirubin: 0.6  -MAP: 74-98  -Sarthak Coma Scale: 15  -PAO2/FIO2: n/a  -Procalcitonin: n/a  -Platelets: 221/175  -Other clinical indicators: n/a    Treatment: --current IV Rocephin, IV Azithromycin changed to PO from -discharge    Risk Factors: Pneumonia  Options provided:  -- Sepsis was a differential diagnosis and ruled out after study  -- Sepsis with other organ dysfunction, Please specify sepsis associated organ dysfunction below  -- Other - I will add my own diagnosis  -- Refer to Clinical Documentation Reviewer    Query " created by: Roslyn Sidhu on 2/10/2025 4:12 PM      Electronically signed by:  DALTON SEQUEIRA MD 2/12/2025 7:05 AM

## 2025-02-13 NOTE — DOCUMENTATION CLARIFICATION NOTE
PATIENT:               DAWN CAREY  ACCT #:                  [id number]  MRN:                       [id number]  :                       1940  ADMIT DATE:       2025 10:16 AM  DISCH DATE:        2/3/2025 5:13 PM  RESPONDING PROVIDER #:        [id number]          PROVIDER RESPONSE TEXT:    Acute Respiratory Failure ruled out after further workup    CDI QUERY TEXT:    Clarification        Instruction:    Based on your assessment of the patient and the clinical information, please provide the requested documentation by clicking on the appropriate radio button and enter any additional information if prompted.    Question: Please clarify diagnosis of respiratory failure as    When answering this query, please exercise your independent professional judgment. The fact that a question is being asked, does not imply that any particular answer is desired or expected.    The patient's clinical indicators include:  Clinical Information: 84 yof with pneumonia    Documented Diagnosis:    Clinical Indicators and Documentation:  -Vital Signs: -2/3 Oxygen saturation: 90-98, -2/3 Respirations 15-18  -ABG results: n/a  -Physical Exam Findings:  -Breath sounds:  Coarse breath sounds  -Distress: None  -Cyanosis: None  -Oxygen Therapy:  1025- 1237 with oxygen noted  at 2L, to room air there after  -Pulmonary Consult: n/a    Treatment: Oxygen at 2L    Risk Factors: Pneumonia  Options provided:  -- Acute Respiratory Failure ruled out after further workup  -- Acute Respiratory Failure as evidenced by, Please specify additional information below  -- Other - I will add my own diagnosis  -- Refer to Clinical Documentation Reviewer    Query created by: Roslyn Sidhu on 2025 2:55 PM      Electronically signed by:  DALTON SEQUEIRA MD 2025 3:21 PM

## 2025-02-18 ENCOUNTER — NURSING HOME VISIT (OUTPATIENT)
Dept: POST ACUTE CARE | Facility: EXTERNAL LOCATION | Age: 85
End: 2025-02-18
Payer: MEDICARE

## 2025-02-18 VITALS
HEIGHT: 61 IN | TEMPERATURE: 97.4 F | WEIGHT: 142 LBS | DIASTOLIC BLOOD PRESSURE: 53 MMHG | BODY MASS INDEX: 26.81 KG/M2 | RESPIRATION RATE: 18 BRPM | HEART RATE: 70 BPM | SYSTOLIC BLOOD PRESSURE: 125 MMHG | OXYGEN SATURATION: 97 %

## 2025-02-18 DIAGNOSIS — B96.89 ACUTE BACTERIAL BRONCHITIS: ICD-10-CM

## 2025-02-18 DIAGNOSIS — J20.8 ACUTE BACTERIAL BRONCHITIS: ICD-10-CM

## 2025-02-18 DIAGNOSIS — I10 ESSENTIAL HYPERTENSION: ICD-10-CM

## 2025-02-18 DIAGNOSIS — Z74.09 IMPAIRED FUNCTIONAL MOBILITY, BALANCE, GAIT, AND ENDURANCE: Primary | ICD-10-CM

## 2025-02-18 DIAGNOSIS — F32.A DEPRESSION, UNSPECIFIED DEPRESSION TYPE: ICD-10-CM

## 2025-02-18 PROCEDURE — 99309 SBSQ NF CARE MODERATE MDM 30: CPT | Performed by: PHYSICIAN ASSISTANT

## 2025-02-18 NOTE — LETTER
"Patient: Therese Child  : 1940    Encounter Date: 2025  Name: Therese Child  YOB: 1940    Chief complaint: Impaired mobility. Bronchitis.    HPI: Patient resting comfortably in her room. Review of chart shows she is walking > 50 feet with walker during supervised ambulation. Patient describes her breathing as \" good\". Pulse oximetry 97% on room air. Afebrile. Patient denies fever, chills, sob, wheezing, chest pain, or sore throat.    Review of systems:   ROS negative except were noted in HPI.    Code Status: DNR-CC    /53   Pulse 70   Temp 36.3 °C (97.4 °F)   Resp 18   Ht 1.549 m (5' 1\")   Wt 64.4 kg (142 lb)   SpO2 97%   BMI 26.83 kg/m²      Physical Exam  Constitutional:       General: She is not in acute distress.  HENT:      Head: Normocephalic.      Nose: Nose normal.      Mouth/Throat:      Mouth: Mucous membranes are moist.   Eyes:      Extraocular Movements: Extraocular movements intact.      Pupils: Pupils are equal, round, and reactive to light.   Cardiovascular:      Rate and Rhythm: Normal rate and regular rhythm.      Pulses: Normal pulses.   Pulmonary:      Effort: Pulmonary effort is normal.      Breath sounds: Normal breath sounds. No wheezing or rales.   Abdominal:      General: Bowel sounds are normal. There is no distension.      Palpations: Abdomen is soft.      Tenderness: There is no abdominal tenderness. There is no guarding.   Genitourinary:     Comments: Voiding.  Musculoskeletal:      Cervical back: Normal range of motion.      Comments: R leg strength >L, not new.   Lymphadenopathy:      Cervical: No cervical adenopathy.   Neurological:      Mental Status: She is alert.     Medications reviewed during visit at facility.  Vitamin D 3 50 meq po daily  Mucinex 600 mg po bid  Albuterol 108 two puffs q 4 hours prn  Vitamin C 500 mg po bid  Tylenol 650 mg po q 4 hours prn  MOM 30 ml po daily prn  Doxazosin 2 mg po daily  Atorvastatin " 80 mg po daily  Aspirin 81 mg po daily  Bupropion  mg po daily  Hydralazine 50 mg po bid  Gabapentin 300 mg po tid  Losartan / hydrochlorothiazide 100/25 mg po daily  Methocarbamol 500 mg po bid  Cartia  mg po daily  Labs reviewed at facility:    Assessment/Plan   Problem List Items Addressed This Visit       Essential hypertension     Stable BP readings. Continue with Losartan / hydrochlorothiazide 100/25 mg po daily, Hydralazine 50 mg po bid, Doxazosin 2 mg po daily, and Cartia  mg po daily          Impaired functional mobility, balance, gait, and endurance - Primary     Review physical and occupational therapy notes. Discuss home going needs with social service. Walking > 50 feet.         Depression     Calm and cooperative. Continue with Bupropion  mg po daily.          Acute bacterial bronchitis     Resolved. Complete steroid and antibiotic course. Albuterol 108 two puffs q 4 hours prn, Mucinex 600 mg po bid            Time:    Zach Rashid PA-C       Electronically Signed By: Zach Rashid PA-C   2/18/25 11:06 PM

## 2025-02-19 NOTE — ASSESSMENT & PLAN NOTE
Stable BP readings. Continue with Losartan / hydrochlorothiazide 100/25 mg po daily, Hydralazine 50 mg po bid, Doxazosin 2 mg po daily, and Cartia  mg po daily

## 2025-02-19 NOTE — ASSESSMENT & PLAN NOTE
Resolved. Complete steroid and antibiotic course. Albuterol 108 two puffs q 4 hours prn, Mucinex 600 mg po bid

## 2025-02-19 NOTE — ASSESSMENT & PLAN NOTE
Review physical and occupational therapy notes. Discuss home going needs with social service. Walking > 50 feet.

## 2025-02-19 NOTE — PROGRESS NOTES
"2/18/2025  Name: Therese Child  YOB: 1940    Chief complaint: Impaired mobility. Bronchitis.    HPI: Patient resting comfortably in her room. Review of chart shows she is walking > 50 feet with walker during supervised ambulation. Patient describes her breathing as \" good\". Pulse oximetry 97% on room air. Afebrile. Patient denies fever, chills, sob, wheezing, chest pain, or sore throat.    Review of systems:   ROS negative except were noted in HPI.    Code Status: DNR-CC    /53   Pulse 70   Temp 36.3 °C (97.4 °F)   Resp 18   Ht 1.549 m (5' 1\")   Wt 64.4 kg (142 lb)   SpO2 97%   BMI 26.83 kg/m²      Physical Exam  Constitutional:       General: She is not in acute distress.  HENT:      Head: Normocephalic.      Nose: Nose normal.      Mouth/Throat:      Mouth: Mucous membranes are moist.   Eyes:      Extraocular Movements: Extraocular movements intact.      Pupils: Pupils are equal, round, and reactive to light.   Cardiovascular:      Rate and Rhythm: Normal rate and regular rhythm.      Pulses: Normal pulses.   Pulmonary:      Effort: Pulmonary effort is normal.      Breath sounds: Normal breath sounds. No wheezing or rales.   Abdominal:      General: Bowel sounds are normal. There is no distension.      Palpations: Abdomen is soft.      Tenderness: There is no abdominal tenderness. There is no guarding.   Genitourinary:     Comments: Voiding.  Musculoskeletal:      Cervical back: Normal range of motion.      Comments: R leg strength >L, not new.   Lymphadenopathy:      Cervical: No cervical adenopathy.   Neurological:      Mental Status: She is alert.     Medications reviewed during visit at facility.  Vitamin D 3 50 meq po daily  Mucinex 600 mg po bid  Albuterol 108 two puffs q 4 hours prn  Vitamin C 500 mg po bid  Tylenol 650 mg po q 4 hours prn  MOM 30 ml po daily prn  Doxazosin 2 mg po daily  Atorvastatin 80 mg po daily  Aspirin 81 mg po daily  Bupropion  mg po " daily  Hydralazine 50 mg po bid  Gabapentin 300 mg po tid  Losartan / hydrochlorothiazide 100/25 mg po daily  Methocarbamol 500 mg po bid  Cartia  mg po daily  Labs reviewed at facility:    Assessment/Plan    Problem List Items Addressed This Visit       Essential hypertension     Stable BP readings. Continue with Losartan / hydrochlorothiazide 100/25 mg po daily, Hydralazine 50 mg po bid, Doxazosin 2 mg po daily, and Cartia  mg po daily          Impaired functional mobility, balance, gait, and endurance - Primary     Review physical and occupational therapy notes. Discuss home going needs with social service. Walking > 50 feet.         Depression     Calm and cooperative. Continue with Bupropion  mg po daily.          Acute bacterial bronchitis     Resolved. Complete steroid and antibiotic course. Albuterol 108 two puffs q 4 hours prn, Mucinex 600 mg po bid            Time:    Zach Rashid PA-C

## 2025-02-21 ENCOUNTER — NURSING HOME VISIT (OUTPATIENT)
Dept: POST ACUTE CARE | Facility: EXTERNAL LOCATION | Age: 85
End: 2025-02-21
Payer: MEDICARE

## 2025-02-21 DIAGNOSIS — I10 ESSENTIAL HYPERTENSION: ICD-10-CM

## 2025-02-21 DIAGNOSIS — R11.2 NAUSEA AND VOMITING, UNSPECIFIED VOMITING TYPE: ICD-10-CM

## 2025-02-21 DIAGNOSIS — F32.A DEPRESSION, UNSPECIFIED DEPRESSION TYPE: ICD-10-CM

## 2025-02-21 DIAGNOSIS — B33.8 RSV (RESPIRATORY SYNCYTIAL VIRUS INFECTION): Primary | ICD-10-CM

## 2025-02-21 PROCEDURE — 99308 SBSQ NF CARE LOW MDM 20: CPT | Performed by: INTERNAL MEDICINE

## 2025-02-21 NOTE — LETTER
Patient: Therese Child  : 1940    Encounter Date: 2025    Subjective  Patient ID: Therese Child is a 84 y.o. female who is acute skilled care being seen and evaluated for multiple medical problems.    This patient was admitted with RSV pneumonia and RSV infection, initially she did good but then she started having anorexia nausea, today nursing staff told me that patient has been having nausea and vomiting, flu smear has been done which is negative, previously treated with azithromycin and cefuroxime , these treatments has been completed.  Patient is treated with bupropion, diltiazem, Cardura, losartan, hydralazine, gabapentin, she is on high-dose of gabapentin and high risk medications which includes Cardura and gabapentin.  When patient has nausea vomiting Zofran was started, IV fluids were attempted, IV fluid orders were given.  Tamiflu was started tentatively because we have an outbreak of viral infection but it has been stopped since smears are negative.  Viral infection like RSV and influenza can be quite detrimental to elderly patients.         Review of Systems   Constitutional:  Positive for activity change, appetite change and fatigue.   HENT:  Positive for congestion.    Respiratory:  Positive for cough. Negative for apnea, choking and shortness of breath.    Cardiovascular:  Negative for chest pain and leg swelling.   Gastrointestinal:  Positive for nausea and vomiting. Negative for abdominal pain.   Genitourinary:  Negative for difficulty urinating.   Musculoskeletal:  Positive for arthralgias.   Skin:  Negative for wound.   Neurological:  Positive for weakness.   Psychiatric/Behavioral:  Negative for confusion.        Objective  There were no vitals taken for this visit.    Physical Exam  Vitals reviewed.   Constitutional:       Appearance: Normal appearance. She is normal weight. She is ill-appearing.   HENT:      Head: Normocephalic.   Eyes:      Conjunctiva/sclera: Conjunctivae  normal.   Cardiovascular:      Rate and Rhythm: Normal rate and regular rhythm.   Pulmonary:      Effort: Pulmonary effort is normal.      Breath sounds: Rales present.   Abdominal:      Palpations: Abdomen is soft.      Tenderness: There is no abdominal tenderness.   Musculoskeletal:         General: Tenderness present.      Cervical back: Neck supple.   Skin:     General: Skin is warm and dry.   Neurological:      Mental Status: Mental status is at baseline.   Psychiatric:         Behavior: Behavior normal.         Assessment/Plan  Problem List Items Addressed This Visit             ICD-10-CM    Essential hypertension I10    RSV (respiratory syncytial virus infection) - Primary B33.8    Depression F32.A     Other Visit Diagnoses         Codes    Nausea and vomiting, unspecified vomiting type     R11.2        Patient is not looking great, attempt IV fluids, symptom treatment, in absence of any nasopharyngeal isolation of viruses no further therapy will be given.  We hope that her nausea vomiting will subside appetite will improved.  Patient has a longstanding history of depression.  Periodic assessment and follow-up will be done, patient's discharge could be delayed, any further poor appetite will be treated with further IV fluids, try to prevent ER visits or hospitalizations.  Rest of therapy were reviewed, abdomen is unremarkable, no much pulmonary findings if needed a chest radiograph will be done.  Intermittent fever remains, CBC will be checked.  WBC count was normal, hemoglobin is 9.1, there is a risk that this patient could be dehydrated volume depleted or could require acute care transfer.     Goals    None           Electronically Signed By: Cale Paez MD   2/24/25 10:05 PM

## 2025-02-24 VITALS
DIASTOLIC BLOOD PRESSURE: 54 MMHG | BODY MASS INDEX: 26.83 KG/M2 | HEART RATE: 80 BPM | SYSTOLIC BLOOD PRESSURE: 143 MMHG | WEIGHT: 142 LBS

## 2025-02-24 ASSESSMENT — ENCOUNTER SYMPTOMS
ARTHRALGIAS: 1
DIFFICULTY URINATING: 0
SHORTNESS OF BREATH: 0
WEAKNESS: 1
VOMITING: 1
APNEA: 0
NAUSEA: 1
CONFUSION: 0
CHOKING: 0
ABDOMINAL PAIN: 0
COUGH: 1
ACTIVITY CHANGE: 1
APPETITE CHANGE: 1
WOUND: 0
FATIGUE: 1

## 2025-02-25 LAB
ATRIAL RATE: 66 BPM
P AXIS: 20 DEGREES
P OFFSET: 207 MS
P ONSET: 154 MS
PR INTERVAL: 146 MS
Q ONSET: 227 MS
QRS COUNT: 11 BEATS
QRS DURATION: 84 MS
QT INTERVAL: 370 MS
QTC CALCULATION(BAZETT): 387 MS
QTC FREDERICIA: 382 MS
R AXIS: -26 DEGREES
T AXIS: 111 DEGREES
T OFFSET: 412 MS
VENTRICULAR RATE: 66 BPM

## 2025-02-25 NOTE — PROGRESS NOTES
Subjective   Patient ID: Therese Child is a 84 y.o. female who is acute skilled care being seen and evaluated for multiple medical problems.    This patient was admitted with RSV pneumonia and RSV infection, initially she did good but then she started having anorexia nausea, today nursing staff told me that patient has been having nausea and vomiting, flu smear has been done which is negative, previously treated with azithromycin and cefuroxime , these treatments has been completed.  Patient is treated with bupropion, diltiazem, Cardura, losartan, hydralazine, gabapentin, she is on high-dose of gabapentin and high risk medications which includes Cardura and gabapentin.  When patient has nausea vomiting Zofran was started, IV fluids were attempted, IV fluid orders were given.  Tamiflu was started tentatively because we have an outbreak of viral infection but it has been stopped since smears are negative.  Viral infection like RSV and influenza can be quite detrimental to elderly patients.         Review of Systems   Constitutional:  Positive for activity change, appetite change and fatigue.   HENT:  Positive for congestion.    Respiratory:  Positive for cough. Negative for apnea, choking and shortness of breath.    Cardiovascular:  Negative for chest pain and leg swelling.   Gastrointestinal:  Positive for nausea and vomiting. Negative for abdominal pain.   Genitourinary:  Negative for difficulty urinating.   Musculoskeletal:  Positive for arthralgias.   Skin:  Negative for wound.   Neurological:  Positive for weakness.   Psychiatric/Behavioral:  Negative for confusion.        Objective   There were no vitals taken for this visit.    Physical Exam  Vitals reviewed.   Constitutional:       Appearance: Normal appearance. She is normal weight. She is ill-appearing.   HENT:      Head: Normocephalic.   Eyes:      Conjunctiva/sclera: Conjunctivae normal.   Cardiovascular:      Rate and Rhythm: Normal rate and regular  rhythm.   Pulmonary:      Effort: Pulmonary effort is normal.      Breath sounds: Rales present.   Abdominal:      Palpations: Abdomen is soft.      Tenderness: There is no abdominal tenderness.   Musculoskeletal:         General: Tenderness present.      Cervical back: Neck supple.   Skin:     General: Skin is warm and dry.   Neurological:      Mental Status: Mental status is at baseline.   Psychiatric:         Behavior: Behavior normal.         Assessment/Plan   Problem List Items Addressed This Visit             ICD-10-CM    Essential hypertension I10    RSV (respiratory syncytial virus infection) - Primary B33.8    Depression F32.A     Other Visit Diagnoses         Codes    Nausea and vomiting, unspecified vomiting type     R11.2        Patient is not looking great, attempt IV fluids, symptom treatment, in absence of any nasopharyngeal isolation of viruses no further therapy will be given.  We hope that her nausea vomiting will subside appetite will improved.  Patient has a longstanding history of depression.  Periodic assessment and follow-up will be done, patient's discharge could be delayed, any further poor appetite will be treated with further IV fluids, try to prevent ER visits or hospitalizations.  Rest of therapy were reviewed, abdomen is unremarkable, no much pulmonary findings if needed a chest radiograph will be done.  Intermittent fever remains, CBC will be checked.  WBC count was normal, hemoglobin is 9.1, there is a risk that this patient could be dehydrated volume depleted or could require acute care transfer.     Goals    None

## 2025-02-28 ENCOUNTER — NURSING HOME VISIT (OUTPATIENT)
Dept: POST ACUTE CARE | Facility: EXTERNAL LOCATION | Age: 85
End: 2025-02-28
Payer: MEDICARE

## 2025-02-28 DIAGNOSIS — Z74.09 IMPAIRED FUNCTIONAL MOBILITY, BALANCE, GAIT, AND ENDURANCE: Primary | ICD-10-CM

## 2025-02-28 DIAGNOSIS — E87.6 HYPOKALEMIA: ICD-10-CM

## 2025-02-28 DIAGNOSIS — I10 ESSENTIAL HYPERTENSION: ICD-10-CM

## 2025-02-28 DIAGNOSIS — F32.A DEPRESSION, UNSPECIFIED DEPRESSION TYPE: ICD-10-CM

## 2025-02-28 PROCEDURE — 99308 SBSQ NF CARE LOW MDM 20: CPT | Performed by: PHYSICIAN ASSISTANT

## 2025-02-28 NOTE — LETTER
"Patient: Therese Child  : 1940    Encounter Date: 2025  Name: Therese Child  YOB: 1940    Chief complaint: Follow up for impaired mobility.    HPI: Patient making steady progress with physical and occupational therapy. Review of chart shows she is able to walk > 60 feet with walker during supervised ambulation. Planning on discharge to home on 3/7/2025. Patient denies fever,chills, sob, chest pain,palpitation, dizziness or falls.    Extremity Weakness  This is a new problem. The problem has been gradually improving. Pertinent negatives include no abdominal pain, anorexia, arthralgias, change in bowel habit, chest pain, chills, coughing, fever, joint swelling, sore throat or urinary symptoms. Nothing aggravates the symptoms. She has tried relaxation, rest and walking for the symptoms. The treatment provided moderate relief.     Review of systems:   ROS negative except were noted in HPI.    Code Status: DNR-CC    /63   Pulse 70   Temp 36.8 °C (98.2 °F)   Resp 18   Ht 1.549 m (5' 1\")   Wt 64 kg (141 lb)   SpO2 94%   BMI 26.64 kg/m²       Physical Exam  Constitutional:       General: She is not in acute distress.  HENT:      Head: Normocephalic.      Nose: Nose normal.      Mouth/Throat:      Mouth: Mucous membranes are moist.   Eyes:      Extraocular Movements: Extraocular movements intact.      Pupils: Pupils are equal, round, and reactive to light.   Cardiovascular:      Rate and Rhythm: Normal rate and regular rhythm.      Pulses: Normal pulses.   Pulmonary:      Effort: Pulmonary effort is normal.      Breath sounds: Normal breath sounds. No wheezing or rales.   Abdominal:      General: Bowel sounds are normal. There is no distension.      Palpations: Abdomen is soft.      Tenderness: There is no abdominal tenderness. There is no guarding.   Genitourinary:     Comments: Voiding.  Musculoskeletal:      Cervical back: Normal range of motion.      " Comments: R leg strength >L, not new.   Lymphadenopathy:      Cervical: No cervical adenopathy.   Neurological:      Mental Status: She is alert.     Medications reviewed during visit at facility.  Vitamin D 3 50 meq po daily  Mucinex 600 mg po bid  Albuterol 108 two puffs q 4 hours prn  Vitamin C 500 mg po bid  Tylenol 650 mg po q 4 hours prn  MOM 30 ml po daily prn  Doxazosin 2 mg po daily  Atorvastatin 80 mg po daily  Aspirin 81 mg po daily  Bupropion  mg po daily  Hydralazine 50 mg po bid  Gabapentin 300 mg po tid  Losartan / hydrochlorothiazide 100/25 mg po daily  Methocarbamol 500 mg po bid  Cartia  mg po daily  Labs reviewed at facility:    Laboratory: 2025 11:09 CBC and Differential / Comp Metabolic Panel  Latest Version (more available)  Reviewed by sarah on 2025 18:47  Resident Information  Resident: Dawn Child ([id number])  Admit Date: 2/3/2025  Admitting Provider:   Attending Provider:   Copy to List:   Report Information  Collection Date: 2025 06:28  Received Date: 2025 06:28  Reported Date: 2025 11:09  Ord. Provider: Cale Paez  Source Chicas: [id number]  Lab Information  Status: Completed  Flag:    Reporting Lab:   [facility]  Order #:   [id number]  Vendor Order #:   [id number]  Category:   Chemistry, Hematology, Unknown Category  Order Notes  Result for:  DAWN CHILD  ( 1940, F)      Results   Show All Details Result Units Ref. Range Flag Status   CBC and Differential       White Blood Cell Count  4.42 k/uL 3.70-11.00  Final           RBC  3.13 m/uL 3.90-5.20 L Final           Hemoglobin  9.8 g/dL 11.5-15.5 L Final           Hematocrit  29.8 % 36.0-46.0 L Final           MCV  95.2 fL 80.0-100.0  Final           MCH  31.3 pg 26.0-34.0  Final           MCHC  32.9 g/dL 30.5-36.0  Final           RDW-CV  13.6 % 11.5-15.0  Final           Platelet Count  244 k/uL 150-400  Final           MPV  11.2 fL 9.0-12.7  Final            Neut%  34.1 %   Final           Abs Neut  1.51 k/uL 1.45-7.50  Final           Lymph%  48.2 %   Final           Abs Lymph  2.13 k/uL 1.00-4.00  Final           Mono%  9.0 %   Final           Abs Mono  0.40 k/uL <0.87  Final           Eosin%  7.5 %   Final           Abs Eosin  0.33 k/uL <0.46  Final           Baso%  0.7 %   Final           Abs Baso  0.03 k/uL <0.11  Final           Immature Gran %%  0.5 %   Final           Abs Immature Gran  <0.03 k/uL <0.10  Final           NRBC  0.0 /100 WBC   Final           Absolute nRBC  <0.01 k/uL <0.01  Final           Diff Type  Auto    Final      Scheduled: 2/28/2025 7:00 AM  Scheduled: 2/28/2025 7:00 AM   Comp Metabolic Panel       Protein, Total  5.4 g/dL 6.3-8.0 L Final           Albumin  3.1 g/dL 3.9-4.9 L Final           Calcium, Total  8.4 mg/dL 8.5-10.2 L Final           Bilirubin, Total  0.4 mg/dL 0.2-1.3  Final           Alkaline Phosphatase  47 U/L   Final           AST  15 U/L 13-35  Final           ALT  12 U/L 7-38  Final           Glucose  86 mg/dL 74-99  Final   BUN  19 mg/dL 7-21  Final           Creatinine  0.92 mg/dL 0.58-0.96  Final           Sodium  143 mmol/L 136-144  Final           Potassium  3.2 mmol/L 3.7-5.1 L Final           Chloride  105 mmol/L   Final           CO2  27 mmol/L 22-30  Final           Anion Gap  11 mmol/L 8-15  Final           Estimated Glomerular Filtration Rate  62 mL/min/1.73 meters squared >=60  Ingrid  Assessment/Plan   Problem List Items Addressed This Visit       Essential hypertension     Review BP readings. Continue with Losartan / hydrochlorothiazide 100/25 mg po daily, Hydralazine 50 mg po bid, Doxazosin 2 mg po daily, and Cartia  mg po daily.            Impaired functional mobility, balance, gait, and endurance - Primary     Discuss home going needs with social service. Review physical and occupational therapy notes.          Depression     Calm and cooperative. Continue with Bupropion  mg po daily.              Hypokalemia     Review lab work. Potassium Chloride 20 meq po daily x 3 days. CMP CBC with diff on Friday.            Time:    Zach Rashid PA-C       Electronically Signed By: Zach Rashid PA-C   3/1/25 11:42 AM

## 2025-03-01 VITALS
WEIGHT: 141 LBS | BODY MASS INDEX: 26.62 KG/M2 | SYSTOLIC BLOOD PRESSURE: 144 MMHG | HEART RATE: 70 BPM | HEIGHT: 61 IN | TEMPERATURE: 98.2 F | OXYGEN SATURATION: 94 % | DIASTOLIC BLOOD PRESSURE: 63 MMHG | RESPIRATION RATE: 18 BRPM

## 2025-03-01 PROBLEM — E87.6 HYPOKALEMIA: Status: ACTIVE | Noted: 2025-03-01

## 2025-03-01 ASSESSMENT — ENCOUNTER SYMPTOMS
JOINT SWELLING: 0
CHILLS: 0
FEVER: 0
EXTREMITY WEAKNESS: 1
ABDOMINAL PAIN: 0
COUGH: 0
SORE THROAT: 0
CHANGE IN BOWEL HABIT: 0
ARTHRALGIAS: 0
ANOREXIA: 0

## 2025-03-01 NOTE — PROGRESS NOTES
"2/28/2025  Name: Therese Child  YOB: 1940    Chief complaint: Follow up for impaired mobility.    HPI: Patient making steady progress with physical and occupational therapy. Review of chart shows she is able to walk > 60 feet with walker during supervised ambulation. Planning on discharge to home on 3/7/2025. Patient denies fever,chills, sob, chest pain,palpitation, dizziness or falls.    Extremity Weakness  This is a new problem. The problem has been gradually improving. Pertinent negatives include no abdominal pain, anorexia, arthralgias, change in bowel habit, chest pain, chills, coughing, fever, joint swelling, sore throat or urinary symptoms. Nothing aggravates the symptoms. She has tried relaxation, rest and walking for the symptoms. The treatment provided moderate relief.     Review of systems:   ROS negative except were noted in HPI.    Code Status: DNR-CC    /63   Pulse 70   Temp 36.8 °C (98.2 °F)   Resp 18   Ht 1.549 m (5' 1\")   Wt 64 kg (141 lb)   SpO2 94%   BMI 26.64 kg/m²       Physical Exam  Constitutional:       General: She is not in acute distress.  HENT:      Head: Normocephalic.      Nose: Nose normal.      Mouth/Throat:      Mouth: Mucous membranes are moist.   Eyes:      Extraocular Movements: Extraocular movements intact.      Pupils: Pupils are equal, round, and reactive to light.   Cardiovascular:      Rate and Rhythm: Normal rate and regular rhythm.      Pulses: Normal pulses.   Pulmonary:      Effort: Pulmonary effort is normal.      Breath sounds: Normal breath sounds. No wheezing or rales.   Abdominal:      General: Bowel sounds are normal. There is no distension.      Palpations: Abdomen is soft.      Tenderness: There is no abdominal tenderness. There is no guarding.   Genitourinary:     Comments: Voiding.  Musculoskeletal:      Cervical back: Normal range of motion.      Comments: R leg strength >L, not new.   Lymphadenopathy:      Cervical: No cervical " adenopathy.   Neurological:      Mental Status: She is alert.     Medications reviewed during visit at facility.  Vitamin D 3 50 meq po daily  Mucinex 600 mg po bid  Albuterol 108 two puffs q 4 hours prn  Vitamin C 500 mg po bid  Tylenol 650 mg po q 4 hours prn  MOM 30 ml po daily prn  Doxazosin 2 mg po daily  Atorvastatin 80 mg po daily  Aspirin 81 mg po daily  Bupropion  mg po daily  Hydralazine 50 mg po bid  Gabapentin 300 mg po tid  Losartan / hydrochlorothiazide 100/25 mg po daily  Methocarbamol 500 mg po bid  Cartia  mg po daily  Labs reviewed at facility:    Laboratory: 2025 11:09 CBC and Differential / Comp Metabolic Panel  Latest Version (more available)  Reviewed by sarah on 2025 18:47  Resident Information  Resident: Dawn Child ([id number])  Admit Date: 2/3/2025  Admitting Provider:   Attending Provider:   Copy to List:   Report Information  Collection Date: 2025 06:28  Received Date: 2025 06:28  Reported Date: 2025 11:09  Ord. Provider: Cale Paez  Source Chicas: [id number]  Lab Information  Status: Completed  Flag:    Reporting Lab:   [facility]  Order #:   [id number]  Vendor Order #:   [id number]  Category:   Chemistry, Hematology, Unknown Category  Order Notes  Result for:  DAWN CHILD  ( 1940, F)      Results   Show All Details Result Units Ref. Range Flag Status   CBC and Differential       White Blood Cell Count  4.42 k/uL 3.70-11.00  Final           RBC  3.13 m/uL 3.90-5.20 L Final           Hemoglobin  9.8 g/dL 11.5-15.5 L Final           Hematocrit  29.8 % 36.0-46.0 L Final           MCV  95.2 fL 80.0-100.0  Final           MCH  31.3 pg 26.0-34.0  Final           MCHC  32.9 g/dL 30.5-36.0  Final           RDW-CV  13.6 % 11.5-15.0  Final           Platelet Count  244 k/uL 150-400  Final           MPV  11.2 fL 9.0-12.7  Final           Neut%  34.1 %   Final           Abs Neut  1.51 k/uL 1.45-7.50  Final            Lymph%  48.2 %   Final           Abs Lymph  2.13 k/uL 1.00-4.00  Final           Mono%  9.0 %   Final           Abs Mono  0.40 k/uL <0.87  Final           Eosin%  7.5 %   Final           Abs Eosin  0.33 k/uL <0.46  Final           Baso%  0.7 %   Final           Abs Baso  0.03 k/uL <0.11  Final           Immature Gran %%  0.5 %   Final           Abs Immature Gran  <0.03 k/uL <0.10  Final           NRBC  0.0 /100 WBC   Final           Absolute nRBC  <0.01 k/uL <0.01  Final           Diff Type  Auto    Final      Scheduled: 2/28/2025 7:00 AM  Scheduled: 2/28/2025 7:00 AM   Comp Metabolic Panel       Protein, Total  5.4 g/dL 6.3-8.0 L Final           Albumin  3.1 g/dL 3.9-4.9 L Final           Calcium, Total  8.4 mg/dL 8.5-10.2 L Final           Bilirubin, Total  0.4 mg/dL 0.2-1.3  Final           Alkaline Phosphatase  47 U/L   Final           AST  15 U/L 13-35  Final           ALT  12 U/L 7-38  Final           Glucose  86 mg/dL 74-99  Final   BUN  19 mg/dL 7-21  Final           Creatinine  0.92 mg/dL 0.58-0.96  Final           Sodium  143 mmol/L 136-144  Final           Potassium  3.2 mmol/L 3.7-5.1 L Final           Chloride  105 mmol/L   Final           CO2  27 mmol/L 22-30  Final           Anion Gap  11 mmol/L 8-15  Final           Estimated Glomerular Filtration Rate  62 mL/min/1.73 meters squared >=60  Ingrid  Assessment/Plan    Problem List Items Addressed This Visit       Essential hypertension     Review BP readings. Continue with Losartan / hydrochlorothiazide 100/25 mg po daily, Hydralazine 50 mg po bid, Doxazosin 2 mg po daily, and Cartia  mg po daily.            Impaired functional mobility, balance, gait, and endurance - Primary     Discuss home going needs with social service. Review physical and occupational therapy notes.          Depression     Calm and cooperative. Continue with Bupropion  mg po daily.             Hypokalemia     Review lab work. Potassium Chloride 20 meq po  daily x 3 days. CMP CBC with diff on Friday.            Time:    Zach Rashid PA-C

## 2025-03-05 ENCOUNTER — NURSING HOME VISIT (OUTPATIENT)
Dept: POST ACUTE CARE | Facility: EXTERNAL LOCATION | Age: 85
End: 2025-03-05
Payer: MEDICARE

## 2025-03-05 DIAGNOSIS — Z74.09 IMPAIRED FUNCTIONAL MOBILITY, BALANCE, GAIT, AND ENDURANCE: ICD-10-CM

## 2025-03-05 DIAGNOSIS — I10 ESSENTIAL HYPERTENSION: Primary | ICD-10-CM

## 2025-03-05 DIAGNOSIS — F32.A DEPRESSION, UNSPECIFIED DEPRESSION TYPE: ICD-10-CM

## 2025-03-05 DIAGNOSIS — B33.8 RSV (RESPIRATORY SYNCYTIAL VIRUS INFECTION): ICD-10-CM

## 2025-03-05 DIAGNOSIS — E87.6 HYPOKALEMIA: ICD-10-CM

## 2025-03-05 DIAGNOSIS — J18.9 PNEUMONIA OF LEFT LUNG DUE TO INFECTIOUS ORGANISM, UNSPECIFIED PART OF LUNG: ICD-10-CM

## 2025-03-05 NOTE — LETTER
Patient: Therese Child  : 1940    Encounter Date: 2025    Subjective  Patient ID: Therese Child is a 84 y.o. female who is acute skilled care being seen and evaluated for multiple medical problems.    Patient is doing much better now, they said that patient will be discharged today, after having RSV infection and acute bronchitis or bronchiolitis secondary to RSV infection patient has been recovering, patient has been here in the facility for 1 month, she has been given potassium supplement because of hypokalemia, patient has a history of hydrocephalus, patient remains on aspirin, diltiazem, atorvastatin, doxazosin, gabapentin, losartan.  Usually doxazosin is not a preferred treatment for someone like this patient but primary care physician can decide.  Patient's BP readings are stable, her GI ailment has been subsided, she required some supportive care symptomatic management.  She is mobile and ambulatory and actually she is very much excited that she is going home and she is going to be back home with her spouse.  Patient is regularly followed on outpatient basis.         Review of Systems   Constitutional:  Negative for activity change and fatigue.   HENT:  Negative for congestion.    Respiratory:  Negative for cough, shortness of breath and wheezing.    Cardiovascular:  Negative for chest pain and leg swelling.   Gastrointestinal:  Negative for abdominal pain and nausea.   Musculoskeletal:  Positive for arthralgias.   Neurological: Negative.  Negative for weakness.       Objective  /62   Pulse 71   Wt 64 kg (141 lb)   BMI 26.64 kg/m²     Physical Exam  Vitals reviewed.   Constitutional:       Appearance: Normal appearance. She is normal weight.   HENT:      Head: Normocephalic.   Eyes:      Conjunctiva/sclera: Conjunctivae normal.   Cardiovascular:      Rate and Rhythm: Normal rate and regular rhythm.   Pulmonary:      Effort: Pulmonary effort is normal.      Breath sounds: Normal  breath sounds.   Abdominal:      Palpations: Abdomen is soft.   Musculoskeletal:         General: Deformity present.      Cervical back: Neck supple.   Skin:     General: Skin is warm and dry.   Neurological:      General: No focal deficit present.   Psychiatric:         Behavior: Behavior normal.         Assessment/Plan  Problem List Items Addressed This Visit             ICD-10-CM    Essential hypertension - Primary I10    Pneumonia of left lung due to infectious organism, unspecified part of lung J18.9    RSV (respiratory syncytial virus infection) B33.8    Impaired functional mobility, balance, gait, and endurance Z74.09    Depression F32.A    Hypokalemia E87.6   She is doing well, BP readings are stable, RSV bronchiolitis subsided, patient has history of depression not on any specific therapy, remains on 3 antihypertensives, not sure why on gabapentin, potassium chloride to be continued because of couple of episodes of hypokalemia.  Mobility is improved, assist devices needed, confidence is better, home health care can be obtained if needed, patient has a supportive environment at home, as she has no problem in taking few steps, she has no problem and ambulating around, she can perform her daily ADLs.  Medications will not be disturbed, follow-up with primary care in couple of weeks, stable condition for discharge.     Goals    None           Electronically Signed By: Cale Paez MD   3/8/25  2:09 PM

## 2025-03-08 VITALS
SYSTOLIC BLOOD PRESSURE: 121 MMHG | HEART RATE: 71 BPM | WEIGHT: 141 LBS | DIASTOLIC BLOOD PRESSURE: 62 MMHG | BODY MASS INDEX: 26.64 KG/M2

## 2025-03-08 ASSESSMENT — ENCOUNTER SYMPTOMS
WEAKNESS: 0
FATIGUE: 0
SHORTNESS OF BREATH: 0
ABDOMINAL PAIN: 0
COUGH: 0
NEUROLOGICAL NEGATIVE: 1
NAUSEA: 0
ARTHRALGIAS: 1
ACTIVITY CHANGE: 0
WHEEZING: 0

## 2025-03-08 NOTE — PROGRESS NOTES
Subjective   Patient ID: Therese Child is a 84 y.o. female who is acute skilled care being seen and evaluated for multiple medical problems.    Patient is doing much better now, they said that patient will be discharged today, after having RSV infection and acute bronchitis or bronchiolitis secondary to RSV infection patient has been recovering, patient has been here in the facility for 1 month, she has been given potassium supplement because of hypokalemia, patient has a history of hydrocephalus, patient remains on aspirin, diltiazem, atorvastatin, doxazosin, gabapentin, losartan.  Usually doxazosin is not a preferred treatment for someone like this patient but primary care physician can decide.  Patient's BP readings are stable, her GI ailment has been subsided, she required some supportive care symptomatic management.  She is mobile and ambulatory and actually she is very much excited that she is going home and she is going to be back home with her spouse.  Patient is regularly followed on outpatient basis.         Review of Systems   Constitutional:  Negative for activity change and fatigue.   HENT:  Negative for congestion.    Respiratory:  Negative for cough, shortness of breath and wheezing.    Cardiovascular:  Negative for chest pain and leg swelling.   Gastrointestinal:  Negative for abdominal pain and nausea.   Musculoskeletal:  Positive for arthralgias.   Neurological: Negative.  Negative for weakness.       Objective   /62   Pulse 71   Wt 64 kg (141 lb)   BMI 26.64 kg/m²     Physical Exam  Vitals reviewed.   Constitutional:       Appearance: Normal appearance. She is normal weight.   HENT:      Head: Normocephalic.   Eyes:      Conjunctiva/sclera: Conjunctivae normal.   Cardiovascular:      Rate and Rhythm: Normal rate and regular rhythm.   Pulmonary:      Effort: Pulmonary effort is normal.      Breath sounds: Normal breath sounds.   Abdominal:      Palpations: Abdomen is soft.    Musculoskeletal:         General: Deformity present.      Cervical back: Neck supple.   Skin:     General: Skin is warm and dry.   Neurological:      General: No focal deficit present.   Psychiatric:         Behavior: Behavior normal.         Assessment/Plan   Problem List Items Addressed This Visit             ICD-10-CM    Essential hypertension - Primary I10    Pneumonia of left lung due to infectious organism, unspecified part of lung J18.9    RSV (respiratory syncytial virus infection) B33.8    Impaired functional mobility, balance, gait, and endurance Z74.09    Depression F32.A    Hypokalemia E87.6   She is doing well, BP readings are stable, RSV bronchiolitis subsided, patient has history of depression not on any specific therapy, remains on 3 antihypertensives, not sure why on gabapentin, potassium chloride to be continued because of couple of episodes of hypokalemia.  Mobility is improved, assist devices needed, confidence is better, home health care can be obtained if needed, patient has a supportive environment at home, as she has no problem in taking few steps, she has no problem and ambulating around, she can perform her daily ADLs.  Medications will not be disturbed, follow-up with primary care in couple of weeks, stable condition for discharge.     Goals    None